# Patient Record
Sex: FEMALE | Race: WHITE | NOT HISPANIC OR LATINO | Employment: OTHER | URBAN - METROPOLITAN AREA
[De-identification: names, ages, dates, MRNs, and addresses within clinical notes are randomized per-mention and may not be internally consistent; named-entity substitution may affect disease eponyms.]

---

## 2017-08-04 ENCOUNTER — TRANSCRIBE ORDERS (OUTPATIENT)
Dept: ADMINISTRATIVE | Facility: HOSPITAL | Age: 82
End: 2017-08-04

## 2017-08-04 DIAGNOSIS — C18.9 MALIGNANT NEOPLASM OF COLON, UNSPECIFIED PART OF COLON (HCC): ICD-10-CM

## 2017-08-04 DIAGNOSIS — C18.2 MALIGNANT NEOPLASM OF ASCENDING COLON (HCC): Primary | ICD-10-CM

## 2017-08-07 ENCOUNTER — OFFICE VISIT (OUTPATIENT)
Dept: LAB | Facility: CLINIC | Age: 82
End: 2017-08-07
Payer: COMMERCIAL

## 2017-08-07 ENCOUNTER — APPOINTMENT (OUTPATIENT)
Dept: LAB | Facility: CLINIC | Age: 82
End: 2017-08-07
Payer: COMMERCIAL

## 2017-08-07 DIAGNOSIS — C18.2 MALIGNANT NEOPLASM OF ASCENDING COLON (HCC): ICD-10-CM

## 2017-08-07 LAB
ALBUMIN SERPL BCP-MCNC: 3.3 G/DL (ref 3.5–5)
ALP SERPL-CCNC: 55 U/L (ref 46–116)
ALT SERPL W P-5'-P-CCNC: 16 U/L (ref 12–78)
ANION GAP SERPL CALCULATED.3IONS-SCNC: 9 MMOL/L (ref 4–13)
AST SERPL W P-5'-P-CCNC: 17 U/L (ref 5–45)
BASOPHILS # BLD AUTO: 0.09 THOUSANDS/ΜL (ref 0–0.1)
BASOPHILS NFR BLD AUTO: 1 % (ref 0–1)
BILIRUB SERPL-MCNC: 1.1 MG/DL (ref 0.2–1)
BUN SERPL-MCNC: 16 MG/DL (ref 5–25)
CALCIUM SERPL-MCNC: 9.1 MG/DL (ref 8.3–10.1)
CHLORIDE SERPL-SCNC: 104 MMOL/L (ref 100–108)
CO2 SERPL-SCNC: 28 MMOL/L (ref 21–32)
CREAT SERPL-MCNC: 0.89 MG/DL (ref 0.6–1.3)
EOSINOPHIL # BLD AUTO: 0.15 THOUSAND/ΜL (ref 0–0.61)
EOSINOPHIL NFR BLD AUTO: 2 % (ref 0–6)
ERYTHROCYTE [DISTWIDTH] IN BLOOD BY AUTOMATED COUNT: 14.1 % (ref 11.6–15.1)
EST. AVERAGE GLUCOSE BLD GHB EST-MCNC: 117 MG/DL
GFR SERPL CREATININE-BSD FRML MDRD: 61 ML/MIN/1.73SQ M
GLUCOSE SERPL-MCNC: 81 MG/DL (ref 65–140)
HBA1C MFR BLD: 5.7 % (ref 4.2–6.3)
HCT VFR BLD AUTO: 46.5 % (ref 34.8–46.1)
HGB BLD-MCNC: 15 G/DL (ref 11.5–15.4)
LYMPHOCYTES # BLD AUTO: 1.72 THOUSANDS/ΜL (ref 0.6–4.47)
LYMPHOCYTES NFR BLD AUTO: 21 % (ref 14–44)
MAGNESIUM SERPL-MCNC: 1.9 MG/DL (ref 1.6–2.6)
MCH RBC QN AUTO: 30.8 PG (ref 26.8–34.3)
MCHC RBC AUTO-ENTMCNC: 32.3 G/DL (ref 31.4–37.4)
MCV RBC AUTO: 96 FL (ref 82–98)
MONOCYTES # BLD AUTO: 0.83 THOUSAND/ΜL (ref 0.17–1.22)
MONOCYTES NFR BLD AUTO: 10 % (ref 4–12)
NEUTROPHILS # BLD AUTO: 5.3 THOUSANDS/ΜL (ref 1.85–7.62)
NEUTS SEG NFR BLD AUTO: 66 % (ref 43–75)
PHOSPHATE SERPL-MCNC: 4 MG/DL (ref 2.3–4.1)
PLATELET # BLD AUTO: 251 THOUSANDS/UL (ref 149–390)
PMV BLD AUTO: 9.1 FL (ref 8.9–12.7)
POTASSIUM SERPL-SCNC: 4.2 MMOL/L (ref 3.5–5.3)
PROT SERPL-MCNC: 7 G/DL (ref 6.4–8.2)
RBC # BLD AUTO: 4.87 MILLION/UL (ref 3.81–5.12)
SODIUM SERPL-SCNC: 141 MMOL/L (ref 136–145)
WBC # BLD AUTO: 8.09 THOUSAND/UL (ref 4.31–10.16)

## 2017-08-07 PROCEDURE — 84100 ASSAY OF PHOSPHORUS: CPT

## 2017-08-07 PROCEDURE — 83036 HEMOGLOBIN GLYCOSYLATED A1C: CPT

## 2017-08-07 PROCEDURE — 86850 RBC ANTIBODY SCREEN: CPT

## 2017-08-07 PROCEDURE — 86900 BLOOD TYPING SEROLOGIC ABO: CPT

## 2017-08-07 PROCEDURE — 85025 COMPLETE CBC W/AUTO DIFF WBC: CPT

## 2017-08-07 PROCEDURE — 93005 ELECTROCARDIOGRAM TRACING: CPT

## 2017-08-07 PROCEDURE — 86920 COMPATIBILITY TEST SPIN: CPT

## 2017-08-07 PROCEDURE — 80053 COMPREHEN METABOLIC PANEL: CPT

## 2017-08-07 PROCEDURE — 36415 COLL VENOUS BLD VENIPUNCTURE: CPT

## 2017-08-07 PROCEDURE — 86923 COMPATIBILITY TEST ELECTRIC: CPT

## 2017-08-07 PROCEDURE — 86901 BLOOD TYPING SEROLOGIC RH(D): CPT

## 2017-08-07 PROCEDURE — 83735 ASSAY OF MAGNESIUM: CPT

## 2017-08-08 LAB
ABO GROUP BLD: NORMAL
ATRIAL RATE: 67 BPM
BLD GP AB SCN SERPL QL: NEGATIVE
P AXIS: 75 DEGREES
PR INTERVAL: 120 MS
QRS AXIS: -5 DEGREES
QRSD INTERVAL: 76 MS
QT INTERVAL: 394 MS
QTC INTERVAL: 416 MS
RH BLD: NEGATIVE
SPECIMEN EXPIRATION DATE: NORMAL
T WAVE AXIS: 105 DEGREES
VENTRICULAR RATE: 67 BPM

## 2017-08-14 ENCOUNTER — HOSPITAL ENCOUNTER (INPATIENT)
Facility: HOSPITAL | Age: 82
LOS: 7 days | Discharge: HOME HEALTH | DRG: 331 | End: 2017-08-22
Attending: EMERGENCY MEDICINE | Admitting: COLON & RECTAL SURGERY
Payer: COMMERCIAL

## 2017-08-14 ENCOUNTER — APPOINTMENT (EMERGENCY)
Dept: CT IMAGING | Facility: HOSPITAL | Age: 82
DRG: 331 | End: 2017-08-14
Payer: COMMERCIAL

## 2017-08-14 DIAGNOSIS — C18.2 MALIGNANT NEOPLASM OF ASCENDING COLON (HCC): ICD-10-CM

## 2017-08-14 DIAGNOSIS — R10.84 GENERALIZED ABDOMINAL PAIN: Primary | ICD-10-CM

## 2017-08-14 DIAGNOSIS — K63.89 COLONIC MASS: ICD-10-CM

## 2017-08-14 DIAGNOSIS — R11.10 VOMITING: ICD-10-CM

## 2017-08-14 LAB
ALBUMIN SERPL BCP-MCNC: 3.6 G/DL (ref 3.5–5)
ALP SERPL-CCNC: 63 U/L (ref 46–116)
ALT SERPL W P-5'-P-CCNC: 21 U/L (ref 12–78)
ANION GAP SERPL CALCULATED.3IONS-SCNC: 8 MMOL/L (ref 4–13)
AST SERPL W P-5'-P-CCNC: 38 U/L (ref 5–45)
BASOPHILS # BLD AUTO: 0.04 THOUSANDS/ΜL (ref 0–0.1)
BASOPHILS NFR BLD AUTO: 0 % (ref 0–1)
BILIRUB SERPL-MCNC: 1.3 MG/DL (ref 0.2–1)
BUN SERPL-MCNC: 14 MG/DL (ref 5–25)
CALCIUM SERPL-MCNC: 9.4 MG/DL (ref 8.3–10.1)
CHLORIDE SERPL-SCNC: 100 MMOL/L (ref 100–108)
CO2 SERPL-SCNC: 28 MMOL/L (ref 21–32)
CREAT SERPL-MCNC: 0.83 MG/DL (ref 0.6–1.3)
EOSINOPHIL # BLD AUTO: 0.02 THOUSAND/ΜL (ref 0–0.61)
EOSINOPHIL NFR BLD AUTO: 0 % (ref 0–6)
ERYTHROCYTE [DISTWIDTH] IN BLOOD BY AUTOMATED COUNT: 14 % (ref 11.6–15.1)
GFR SERPL CREATININE-BSD FRML MDRD: 66 ML/MIN/1.73SQ M
GLUCOSE SERPL-MCNC: 136 MG/DL (ref 65–140)
HCT VFR BLD AUTO: 51.1 % (ref 34.8–46.1)
HGB BLD-MCNC: 16.7 G/DL (ref 11.5–15.4)
LIPASE SERPL-CCNC: 80 U/L (ref 73–393)
LYMPHOCYTES # BLD AUTO: 1.03 THOUSANDS/ΜL (ref 0.6–4.47)
LYMPHOCYTES NFR BLD AUTO: 8 % (ref 14–44)
MCH RBC QN AUTO: 30.6 PG (ref 26.8–34.3)
MCHC RBC AUTO-ENTMCNC: 32.7 G/DL (ref 31.4–37.4)
MCV RBC AUTO: 94 FL (ref 82–98)
MONOCYTES # BLD AUTO: 0.52 THOUSAND/ΜL (ref 0.17–1.22)
MONOCYTES NFR BLD AUTO: 4 % (ref 4–12)
NEUTROPHILS # BLD AUTO: 11 THOUSANDS/ΜL (ref 1.85–7.62)
NEUTS SEG NFR BLD AUTO: 88 % (ref 43–75)
PLATELET # BLD AUTO: 322 THOUSANDS/UL (ref 149–390)
PMV BLD AUTO: 9.1 FL (ref 8.9–12.7)
POTASSIUM SERPL-SCNC: 5.1 MMOL/L (ref 3.5–5.3)
PROT SERPL-MCNC: 8.2 G/DL (ref 6.4–8.2)
RBC # BLD AUTO: 5.45 MILLION/UL (ref 3.81–5.12)
SODIUM SERPL-SCNC: 136 MMOL/L (ref 136–145)
WBC # BLD AUTO: 12.61 THOUSAND/UL (ref 4.31–10.16)

## 2017-08-14 PROCEDURE — 85025 COMPLETE CBC W/AUTO DIFF WBC: CPT | Performed by: EMERGENCY MEDICINE

## 2017-08-14 PROCEDURE — 36415 COLL VENOUS BLD VENIPUNCTURE: CPT | Performed by: EMERGENCY MEDICINE

## 2017-08-14 PROCEDURE — 83690 ASSAY OF LIPASE: CPT | Performed by: EMERGENCY MEDICINE

## 2017-08-14 PROCEDURE — 71260 CT THORAX DX C+: CPT

## 2017-08-14 PROCEDURE — 96375 TX/PRO/DX INJ NEW DRUG ADDON: CPT

## 2017-08-14 PROCEDURE — 74177 CT ABD & PELVIS W/CONTRAST: CPT

## 2017-08-14 PROCEDURE — 96374 THER/PROPH/DIAG INJ IV PUSH: CPT

## 2017-08-14 PROCEDURE — 80053 COMPREHEN METABOLIC PANEL: CPT | Performed by: EMERGENCY MEDICINE

## 2017-08-14 PROCEDURE — 96361 HYDRATE IV INFUSION ADD-ON: CPT

## 2017-08-14 RX ORDER — MORPHINE SULFATE 4 MG/ML
4 INJECTION, SOLUTION INTRAMUSCULAR; INTRAVENOUS ONCE
Status: COMPLETED | OUTPATIENT
Start: 2017-08-14 | End: 2017-08-14

## 2017-08-14 RX ORDER — ONDANSETRON 2 MG/ML
4 INJECTION INTRAMUSCULAR; INTRAVENOUS ONCE
Status: COMPLETED | OUTPATIENT
Start: 2017-08-14 | End: 2017-08-14

## 2017-08-14 RX ADMIN — SODIUM CHLORIDE 500 ML: 0.9 INJECTION, SOLUTION INTRAVENOUS at 22:38

## 2017-08-14 RX ADMIN — ONDANSETRON 4 MG: 2 INJECTION INTRAMUSCULAR; INTRAVENOUS at 22:38

## 2017-08-14 RX ADMIN — MORPHINE SULFATE 4 MG: 4 INJECTION, SOLUTION INTRAMUSCULAR; INTRAVENOUS at 22:51

## 2017-08-15 PROBLEM — K63.89 COLONIC MASS: Status: ACTIVE | Noted: 2017-08-15

## 2017-08-15 PROCEDURE — 99285 EMERGENCY DEPT VISIT HI MDM: CPT

## 2017-08-15 RX ORDER — POLYETHYLENE GLYCOL 3350 17 G/17G
17 POWDER, FOR SOLUTION ORAL
Status: DISCONTINUED | OUTPATIENT
Start: 2017-08-15 | End: 2017-08-15

## 2017-08-15 RX ORDER — ONDANSETRON 2 MG/ML
4 INJECTION INTRAMUSCULAR; INTRAVENOUS EVERY 6 HOURS PRN
Status: DISCONTINUED | OUTPATIENT
Start: 2017-08-15 | End: 2017-08-22 | Stop reason: HOSPADM

## 2017-08-15 RX ORDER — ONDANSETRON 2 MG/ML
4 INJECTION INTRAMUSCULAR; INTRAVENOUS ONCE
Status: COMPLETED | OUTPATIENT
Start: 2017-08-15 | End: 2017-08-15

## 2017-08-15 RX ORDER — SODIUM CHLORIDE 9 MG/ML
100 INJECTION, SOLUTION INTRAVENOUS CONTINUOUS
Status: DISCONTINUED | OUTPATIENT
Start: 2017-08-15 | End: 2017-08-18

## 2017-08-15 RX ORDER — MORPHINE SULFATE 4 MG/ML
4 INJECTION, SOLUTION INTRAMUSCULAR; INTRAVENOUS ONCE
Status: DISCONTINUED | OUTPATIENT
Start: 2017-08-15 | End: 2017-08-18

## 2017-08-15 RX ORDER — POLYETHYLENE GLYCOL 3350 17 G/17G
17 POWDER, FOR SOLUTION ORAL
Status: DISCONTINUED | OUTPATIENT
Start: 2017-08-15 | End: 2017-08-18

## 2017-08-15 RX ADMIN — POLYETHYLENE GLYCOL 3350 17 G: 17 POWDER, FOR SOLUTION ORAL at 23:00

## 2017-08-15 RX ADMIN — POLYETHYLENE GLYCOL 3350 17 G: 17 POWDER, FOR SOLUTION ORAL at 20:16

## 2017-08-15 RX ADMIN — POLYETHYLENE GLYCOL 3350 17 G: 17 POWDER, FOR SOLUTION ORAL at 15:55

## 2017-08-15 RX ADMIN — SODIUM CHLORIDE 100 ML/HR: 0.9 INJECTION, SOLUTION INTRAVENOUS at 03:43

## 2017-08-15 RX ADMIN — ONDANSETRON 4 MG: 2 INJECTION INTRAMUSCULAR; INTRAVENOUS at 01:54

## 2017-08-15 RX ADMIN — SODIUM CHLORIDE 100 ML/HR: 0.9 INJECTION, SOLUTION INTRAVENOUS at 01:54

## 2017-08-15 RX ADMIN — IOHEXOL 100 ML: 350 INJECTION, SOLUTION INTRAVENOUS at 00:12

## 2017-08-15 RX ADMIN — SODIUM CHLORIDE 100 ML/HR: 0.9 INJECTION, SOLUTION INTRAVENOUS at 13:46

## 2017-08-16 LAB
ANION GAP SERPL CALCULATED.3IONS-SCNC: 5 MMOL/L (ref 4–13)
BASOPHILS # BLD AUTO: 0.05 THOUSANDS/ΜL (ref 0–0.1)
BASOPHILS NFR BLD AUTO: 1 % (ref 0–1)
BUN SERPL-MCNC: 8 MG/DL (ref 5–25)
CALCIUM SERPL-MCNC: 8 MG/DL (ref 8.3–10.1)
CHLORIDE SERPL-SCNC: 106 MMOL/L (ref 100–108)
CO2 SERPL-SCNC: 28 MMOL/L (ref 21–32)
CREAT SERPL-MCNC: 0.64 MG/DL (ref 0.6–1.3)
EOSINOPHIL # BLD AUTO: 0.11 THOUSAND/ΜL (ref 0–0.61)
EOSINOPHIL NFR BLD AUTO: 1 % (ref 0–6)
ERYTHROCYTE [DISTWIDTH] IN BLOOD BY AUTOMATED COUNT: 13.9 % (ref 11.6–15.1)
GFR SERPL CREATININE-BSD FRML MDRD: 83 ML/MIN/1.73SQ M
GLUCOSE SERPL-MCNC: 96 MG/DL (ref 65–140)
HCT VFR BLD AUTO: 43 % (ref 34.8–46.1)
HGB BLD-MCNC: 13.8 G/DL (ref 11.5–15.4)
LYMPHOCYTES # BLD AUTO: 1.31 THOUSANDS/ΜL (ref 0.6–4.47)
LYMPHOCYTES NFR BLD AUTO: 13 % (ref 14–44)
MCH RBC QN AUTO: 31.1 PG (ref 26.8–34.3)
MCHC RBC AUTO-ENTMCNC: 32.1 G/DL (ref 31.4–37.4)
MCV RBC AUTO: 97 FL (ref 82–98)
MONOCYTES # BLD AUTO: 0.79 THOUSAND/ΜL (ref 0.17–1.22)
MONOCYTES NFR BLD AUTO: 8 % (ref 4–12)
NEUTROPHILS # BLD AUTO: 7.63 THOUSANDS/ΜL (ref 1.85–7.62)
NEUTS SEG NFR BLD AUTO: 77 % (ref 43–75)
PLATELET # BLD AUTO: 258 THOUSANDS/UL (ref 149–390)
PMV BLD AUTO: 9.1 FL (ref 8.9–12.7)
POTASSIUM SERPL-SCNC: 4.2 MMOL/L (ref 3.5–5.3)
RBC # BLD AUTO: 4.44 MILLION/UL (ref 3.81–5.12)
SODIUM SERPL-SCNC: 139 MMOL/L (ref 136–145)
WBC # BLD AUTO: 9.89 THOUSAND/UL (ref 4.31–10.16)

## 2017-08-16 PROCEDURE — 80048 BASIC METABOLIC PNL TOTAL CA: CPT | Performed by: PHYSICIAN ASSISTANT

## 2017-08-16 PROCEDURE — 85025 COMPLETE CBC W/AUTO DIFF WBC: CPT | Performed by: PHYSICIAN ASSISTANT

## 2017-08-16 RX ADMIN — POLYETHYLENE GLYCOL 3350 17 G: 17 POWDER, FOR SOLUTION ORAL at 07:19

## 2017-08-16 RX ADMIN — SODIUM CHLORIDE 100 ML/HR: 0.9 INJECTION, SOLUTION INTRAVENOUS at 17:22

## 2017-08-16 RX ADMIN — POLYETHYLENE GLYCOL 3350 17 G: 17 POWDER, FOR SOLUTION ORAL at 19:35

## 2017-08-16 RX ADMIN — POLYETHYLENE GLYCOL 3350 17 G: 17 POWDER, FOR SOLUTION ORAL at 16:41

## 2017-08-16 RX ADMIN — POLYETHYLENE GLYCOL 3350 17 G: 17 POWDER, FOR SOLUTION ORAL at 13:00

## 2017-08-16 RX ADMIN — SODIUM CHLORIDE 100 ML/HR: 0.9 INJECTION, SOLUTION INTRAVENOUS at 01:52

## 2017-08-16 RX ADMIN — POLYETHYLENE GLYCOL 3350 17 G: 17 POWDER, FOR SOLUTION ORAL at 22:17

## 2017-08-16 RX ADMIN — POLYETHYLENE GLYCOL 3350 17 G: 17 POWDER, FOR SOLUTION ORAL at 10:00

## 2017-08-17 LAB
ABO GROUP BLD: NORMAL
BLD GP AB SCN SERPL QL: NEGATIVE
RH BLD: NEGATIVE
SPECIMEN EXPIRATION DATE: NORMAL

## 2017-08-17 PROCEDURE — 86850 RBC ANTIBODY SCREEN: CPT | Performed by: PHYSICIAN ASSISTANT

## 2017-08-17 PROCEDURE — 86901 BLOOD TYPING SEROLOGIC RH(D): CPT | Performed by: PHYSICIAN ASSISTANT

## 2017-08-17 PROCEDURE — 86900 BLOOD TYPING SEROLOGIC ABO: CPT | Performed by: PHYSICIAN ASSISTANT

## 2017-08-17 RX ORDER — METRONIDAZOLE 500 MG/1
500 TABLET ORAL ONCE
Status: COMPLETED | OUTPATIENT
Start: 2017-08-17 | End: 2017-08-17

## 2017-08-17 RX ORDER — NEOMYCIN SULFATE 500 MG/1
1000 TABLET ORAL ONCE
Status: COMPLETED | OUTPATIENT
Start: 2017-08-17 | End: 2017-08-17

## 2017-08-17 RX ADMIN — POLYETHYLENE GLYCOL 3350 17 G: 17 POWDER, FOR SOLUTION ORAL at 16:04

## 2017-08-17 RX ADMIN — POLYETHYLENE GLYCOL 3350 17 G: 17 POWDER, FOR SOLUTION ORAL at 13:35

## 2017-08-17 RX ADMIN — SODIUM CHLORIDE 100 ML/HR: 0.9 INJECTION, SOLUTION INTRAVENOUS at 02:47

## 2017-08-17 RX ADMIN — POLYETHYLENE GLYCOL 3350 17 G: 17 POWDER, FOR SOLUTION ORAL at 22:18

## 2017-08-17 RX ADMIN — POLYETHYLENE GLYCOL 3350 17 G: 17 POWDER, FOR SOLUTION ORAL at 09:17

## 2017-08-17 RX ADMIN — POLYETHYLENE GLYCOL 3350 17 G: 17 POWDER, FOR SOLUTION ORAL at 20:23

## 2017-08-17 RX ADMIN — POLYETHYLENE GLYCOL 3350 17 G: 17 POWDER, FOR SOLUTION ORAL at 06:56

## 2017-08-17 RX ADMIN — METRONIDAZOLE 500 MG: 500 TABLET ORAL at 20:24

## 2017-08-17 RX ADMIN — NEOMYCIN SULFATE 1000 MG: 500 TABLET ORAL at 20:24

## 2017-08-18 ENCOUNTER — ANESTHESIA (INPATIENT)
Dept: PERIOP | Facility: HOSPITAL | Age: 82
DRG: 331 | End: 2017-08-18
Payer: COMMERCIAL

## 2017-08-18 ENCOUNTER — ANESTHESIA EVENT (INPATIENT)
Dept: PERIOP | Facility: HOSPITAL | Age: 82
DRG: 331 | End: 2017-08-18
Payer: COMMERCIAL

## 2017-08-18 LAB
ABO GROUP BLD BPU: NORMAL
ABO GROUP BLD BPU: NORMAL
BPU ID: NORMAL
BPU ID: NORMAL
CROSSMATCH: NORMAL
CROSSMATCH: NORMAL
GLUCOSE SERPL-MCNC: 115 MG/DL (ref 65–140)
UNIT DISPENSE STATUS: NORMAL
UNIT DISPENSE STATUS: NORMAL
UNIT PRODUCT CODE: NORMAL
UNIT PRODUCT CODE: NORMAL
UNIT RH: NORMAL
UNIT RH: NORMAL

## 2017-08-18 PROCEDURE — 88341 IMHCHEM/IMCYTCHM EA ADD ANTB: CPT | Performed by: COLON & RECTAL SURGERY

## 2017-08-18 PROCEDURE — 94760 N-INVAS EAR/PLS OXIMETRY 1: CPT

## 2017-08-18 PROCEDURE — 88309 TISSUE EXAM BY PATHOLOGIST: CPT | Performed by: COLON & RECTAL SURGERY

## 2017-08-18 PROCEDURE — 88342 IMHCHEM/IMCYTCHM 1ST ANTB: CPT | Performed by: COLON & RECTAL SURGERY

## 2017-08-18 PROCEDURE — 0DTF4ZZ RESECTION OF RIGHT LARGE INTESTINE, PERCUTANEOUS ENDOSCOPIC APPROACH: ICD-10-PCS | Performed by: COLON & RECTAL SURGERY

## 2017-08-18 PROCEDURE — 94660 CPAP INITIATION&MGMT: CPT

## 2017-08-18 PROCEDURE — 82948 REAGENT STRIP/BLOOD GLUCOSE: CPT

## 2017-08-18 RX ORDER — SODIUM CHLORIDE 9 MG/ML
84 INJECTION, SOLUTION INTRAVENOUS CONTINUOUS
Status: DISCONTINUED | OUTPATIENT
Start: 2017-08-18 | End: 2017-08-19

## 2017-08-18 RX ORDER — FENTANYL CITRATE 50 UG/ML
INJECTION, SOLUTION INTRAMUSCULAR; INTRAVENOUS AS NEEDED
Status: DISCONTINUED | OUTPATIENT
Start: 2017-08-18 | End: 2017-08-18 | Stop reason: SURG

## 2017-08-18 RX ORDER — SODIUM CHLORIDE 9 MG/ML
INJECTION, SOLUTION INTRAVENOUS CONTINUOUS PRN
Status: DISCONTINUED | OUTPATIENT
Start: 2017-08-18 | End: 2017-08-18 | Stop reason: SURG

## 2017-08-18 RX ORDER — GLYCOPYRROLATE 0.2 MG/ML
INJECTION INTRAMUSCULAR; INTRAVENOUS AS NEEDED
Status: DISCONTINUED | OUTPATIENT
Start: 2017-08-18 | End: 2017-08-18 | Stop reason: SURG

## 2017-08-18 RX ORDER — ONDANSETRON 2 MG/ML
4 INJECTION INTRAMUSCULAR; INTRAVENOUS EVERY 6 HOURS PRN
Status: DISCONTINUED | OUTPATIENT
Start: 2017-08-18 | End: 2017-08-22 | Stop reason: HOSPADM

## 2017-08-18 RX ORDER — HEPARIN SODIUM 5000 [USP'U]/ML
5000 INJECTION, SOLUTION INTRAVENOUS; SUBCUTANEOUS EVERY 8 HOURS SCHEDULED
Status: COMPLETED | OUTPATIENT
Start: 2017-08-18 | End: 2017-08-18

## 2017-08-18 RX ORDER — SODIUM CHLORIDE, SODIUM LACTATE, POTASSIUM CHLORIDE, CALCIUM CHLORIDE 600; 310; 30; 20 MG/100ML; MG/100ML; MG/100ML; MG/100ML
INJECTION, SOLUTION INTRAVENOUS CONTINUOUS PRN
Status: DISCONTINUED | OUTPATIENT
Start: 2017-08-18 | End: 2017-08-18 | Stop reason: SURG

## 2017-08-18 RX ORDER — ONDANSETRON 2 MG/ML
4 INJECTION INTRAMUSCULAR; INTRAVENOUS ONCE AS NEEDED
Status: DISCONTINUED | OUTPATIENT
Start: 2017-08-18 | End: 2017-08-18 | Stop reason: HOSPADM

## 2017-08-18 RX ORDER — CALCIUM CARBONATE 200(500)MG
1000 TABLET,CHEWABLE ORAL DAILY PRN
Status: DISCONTINUED | OUTPATIENT
Start: 2017-08-18 | End: 2017-08-22 | Stop reason: HOSPADM

## 2017-08-18 RX ORDER — ROCURONIUM BROMIDE 10 MG/ML
INJECTION, SOLUTION INTRAVENOUS AS NEEDED
Status: DISCONTINUED | OUTPATIENT
Start: 2017-08-18 | End: 2017-08-18 | Stop reason: SURG

## 2017-08-18 RX ORDER — HEPARIN SODIUM 5000 [USP'U]/ML
INJECTION, SOLUTION INTRAVENOUS; SUBCUTANEOUS AS NEEDED
Status: DISCONTINUED | OUTPATIENT
Start: 2017-08-18 | End: 2017-08-18 | Stop reason: SURG

## 2017-08-18 RX ORDER — LIDOCAINE HYDROCHLORIDE 10 MG/ML
INJECTION, SOLUTION INFILTRATION; PERINEURAL AS NEEDED
Status: DISCONTINUED | OUTPATIENT
Start: 2017-08-18 | End: 2017-08-18 | Stop reason: SURG

## 2017-08-18 RX ORDER — EPHEDRINE SULFATE 50 MG/ML
INJECTION, SOLUTION INTRAVENOUS AS NEEDED
Status: DISCONTINUED | OUTPATIENT
Start: 2017-08-18 | End: 2017-08-18 | Stop reason: SURG

## 2017-08-18 RX ORDER — FENTANYL CITRATE/PF 50 MCG/ML
25 SYRINGE (ML) INJECTION
Status: DISCONTINUED | OUTPATIENT
Start: 2017-08-18 | End: 2017-08-18 | Stop reason: HOSPADM

## 2017-08-18 RX ORDER — HYDROCODONE BITARTRATE AND ACETAMINOPHEN 5; 325 MG/1; MG/1
1 TABLET ORAL EVERY 6 HOURS PRN
Status: DISCONTINUED | OUTPATIENT
Start: 2017-08-18 | End: 2017-08-22 | Stop reason: HOSPADM

## 2017-08-18 RX ORDER — PROPOFOL 10 MG/ML
INJECTION, EMULSION INTRAVENOUS AS NEEDED
Status: DISCONTINUED | OUTPATIENT
Start: 2017-08-18 | End: 2017-08-18 | Stop reason: SURG

## 2017-08-18 RX ADMIN — PROPOFOL 120 MG: 10 INJECTION, EMULSION INTRAVENOUS at 11:13

## 2017-08-18 RX ADMIN — EPHEDRINE SULFATE 10 MG: 50 INJECTION, SOLUTION INTRAMUSCULAR; INTRAVENOUS; SUBCUTANEOUS at 11:15

## 2017-08-18 RX ADMIN — SODIUM CHLORIDE: 0.9 INJECTION, SOLUTION INTRAVENOUS at 11:15

## 2017-08-18 RX ADMIN — HYDROMORPHONE HYDROCHLORIDE 0.5 MG: 1 INJECTION, SOLUTION INTRAMUSCULAR; INTRAVENOUS; SUBCUTANEOUS at 14:38

## 2017-08-18 RX ADMIN — LIDOCAINE HYDROCHLORIDE 50 MG: 10 INJECTION, SOLUTION INFILTRATION; PERINEURAL at 11:13

## 2017-08-18 RX ADMIN — GLYCOPYRROLATE 0.4 MG: 0.2 INJECTION, SOLUTION INTRAMUSCULAR; INTRAVENOUS at 12:27

## 2017-08-18 RX ADMIN — FENTANYL CITRATE 50 MCG: 50 INJECTION INTRAMUSCULAR; INTRAVENOUS at 11:13

## 2017-08-18 RX ADMIN — HEPARIN SODIUM 5000 UNITS: 5000 INJECTION, SOLUTION INTRAVENOUS; SUBCUTANEOUS at 11:40

## 2017-08-18 RX ADMIN — ROCURONIUM BROMIDE 50 MG: 10 INJECTION, SOLUTION INTRAVENOUS at 11:13

## 2017-08-18 RX ADMIN — HEPARIN SODIUM 5000 UNITS: 5000 INJECTION, SOLUTION INTRAVENOUS; SUBCUTANEOUS at 21:50

## 2017-08-18 RX ADMIN — CEFAZOLIN SODIUM 1000 MG: 1 INJECTION, POWDER, FOR SOLUTION INTRAMUSCULAR; INTRAVENOUS at 11:25

## 2017-08-18 RX ADMIN — NEOSTIGMINE METHYLSULFATE 4 MG: 1 INJECTION, SOLUTION INTRAMUSCULAR; INTRAVENOUS; SUBCUTANEOUS at 12:27

## 2017-08-18 RX ADMIN — FENTANYL CITRATE 25 MCG: 50 INJECTION INTRAMUSCULAR; INTRAVENOUS at 13:40

## 2017-08-18 RX ADMIN — SODIUM CHLORIDE, SODIUM LACTATE, POTASSIUM CHLORIDE, AND CALCIUM CHLORIDE: .6; .31; .03; .02 INJECTION, SOLUTION INTRAVENOUS at 10:50

## 2017-08-18 RX ADMIN — SODIUM CHLORIDE 84 ML/HR: 0.9 INJECTION, SOLUTION INTRAVENOUS at 21:50

## 2017-08-18 RX ADMIN — FENTANYL CITRATE 25 MCG: 50 INJECTION INTRAMUSCULAR; INTRAVENOUS at 14:05

## 2017-08-18 RX ADMIN — SODIUM CHLORIDE 84 ML/HR: 0.9 INJECTION, SOLUTION INTRAVENOUS at 17:05

## 2017-08-18 RX ADMIN — FENTANYL CITRATE 50 MCG: 50 INJECTION INTRAMUSCULAR; INTRAVENOUS at 11:54

## 2017-08-18 RX ADMIN — METRONIDAZOLE 500 MG: 500 INJECTION, SOLUTION INTRAVENOUS at 11:25

## 2017-08-18 RX ADMIN — ONDANSETRON 4 MG: 2 INJECTION INTRAMUSCULAR; INTRAVENOUS at 16:24

## 2017-08-18 RX ADMIN — SODIUM CHLORIDE 100 ML/HR: 0.9 INJECTION, SOLUTION INTRAVENOUS at 14:39

## 2017-08-18 RX ADMIN — EPHEDRINE SULFATE 10 MG: 50 INJECTION, SOLUTION INTRAMUSCULAR; INTRAVENOUS; SUBCUTANEOUS at 11:31

## 2017-08-19 LAB
ANION GAP SERPL CALCULATED.3IONS-SCNC: 11 MMOL/L (ref 4–13)
BASOPHILS # BLD AUTO: 0.03 THOUSANDS/ΜL (ref 0–0.1)
BASOPHILS NFR BLD AUTO: 0 % (ref 0–1)
BUN SERPL-MCNC: 5 MG/DL (ref 5–25)
CALCIUM SERPL-MCNC: 7.9 MG/DL (ref 8.3–10.1)
CHLORIDE SERPL-SCNC: 104 MMOL/L (ref 100–108)
CO2 SERPL-SCNC: 24 MMOL/L (ref 21–32)
CREAT SERPL-MCNC: 0.73 MG/DL (ref 0.6–1.3)
EOSINOPHIL # BLD AUTO: 0.02 THOUSAND/ΜL (ref 0–0.61)
EOSINOPHIL NFR BLD AUTO: 0 % (ref 0–6)
ERYTHROCYTE [DISTWIDTH] IN BLOOD BY AUTOMATED COUNT: 13.9 % (ref 11.6–15.1)
GFR SERPL CREATININE-BSD FRML MDRD: 77 ML/MIN/1.73SQ M
GLUCOSE SERPL-MCNC: 104 MG/DL (ref 65–140)
HCT VFR BLD AUTO: 41.9 % (ref 34.8–46.1)
HGB BLD-MCNC: 13.6 G/DL (ref 11.5–15.4)
LYMPHOCYTES # BLD AUTO: 0.99 THOUSANDS/ΜL (ref 0.6–4.47)
LYMPHOCYTES NFR BLD AUTO: 9 % (ref 14–44)
MCH RBC QN AUTO: 30.8 PG (ref 26.8–34.3)
MCHC RBC AUTO-ENTMCNC: 32.5 G/DL (ref 31.4–37.4)
MCV RBC AUTO: 95 FL (ref 82–98)
MONOCYTES # BLD AUTO: 0.91 THOUSAND/ΜL (ref 0.17–1.22)
MONOCYTES NFR BLD AUTO: 8 % (ref 4–12)
NEUTROPHILS # BLD AUTO: 9.42 THOUSANDS/ΜL (ref 1.85–7.62)
NEUTS SEG NFR BLD AUTO: 83 % (ref 43–75)
PLATELET # BLD AUTO: 266 THOUSANDS/UL (ref 149–390)
PMV BLD AUTO: 9.8 FL (ref 8.9–12.7)
POTASSIUM SERPL-SCNC: 3.3 MMOL/L (ref 3.5–5.3)
RBC # BLD AUTO: 4.41 MILLION/UL (ref 3.81–5.12)
SODIUM SERPL-SCNC: 139 MMOL/L (ref 136–145)
WBC # BLD AUTO: 11.37 THOUSAND/UL (ref 4.31–10.16)

## 2017-08-19 PROCEDURE — G8978 MOBILITY CURRENT STATUS: HCPCS

## 2017-08-19 PROCEDURE — 97163 PT EVAL HIGH COMPLEX 45 MIN: CPT

## 2017-08-19 PROCEDURE — 94660 CPAP INITIATION&MGMT: CPT

## 2017-08-19 PROCEDURE — G8979 MOBILITY GOAL STATUS: HCPCS

## 2017-08-19 PROCEDURE — 80048 BASIC METABOLIC PNL TOTAL CA: CPT | Performed by: SURGERY

## 2017-08-19 PROCEDURE — 85025 COMPLETE CBC W/AUTO DIFF WBC: CPT | Performed by: SURGERY

## 2017-08-19 RX ORDER — MAGNESIUM SULFATE HEPTAHYDRATE 40 MG/ML
2 INJECTION, SOLUTION INTRAVENOUS ONCE
Status: COMPLETED | OUTPATIENT
Start: 2017-08-19 | End: 2017-08-19

## 2017-08-19 RX ORDER — DEXTROSE, SODIUM CHLORIDE, AND POTASSIUM CHLORIDE 5; .45; .15 G/100ML; G/100ML; G/100ML
84 INJECTION INTRAVENOUS CONTINUOUS
Status: DISCONTINUED | OUTPATIENT
Start: 2017-08-19 | End: 2017-08-20

## 2017-08-19 RX ORDER — POTASSIUM CHLORIDE 20 MEQ/1
40 TABLET, EXTENDED RELEASE ORAL ONCE
Status: COMPLETED | OUTPATIENT
Start: 2017-08-19 | End: 2017-08-19

## 2017-08-19 RX ADMIN — ONDANSETRON 4 MG: 2 INJECTION INTRAMUSCULAR; INTRAVENOUS at 07:03

## 2017-08-19 RX ADMIN — ENOXAPARIN SODIUM 40 MG: 40 INJECTION SUBCUTANEOUS at 08:28

## 2017-08-19 RX ADMIN — HYDROCODONE BITARTRATE AND ACETAMINOPHEN 1 TABLET: 5; 325 TABLET ORAL at 07:03

## 2017-08-19 RX ADMIN — SODIUM CHLORIDE 84 ML/HR: 0.9 INJECTION, SOLUTION INTRAVENOUS at 08:34

## 2017-08-19 RX ADMIN — DEXTROSE, SODIUM CHLORIDE, AND POTASSIUM CHLORIDE 84 ML/HR: 5; .45; .15 INJECTION INTRAVENOUS at 16:18

## 2017-08-19 RX ADMIN — MAGNESIUM SULFATE HEPTAHYDRATE 2 G: 40 INJECTION, SOLUTION INTRAVENOUS at 16:20

## 2017-08-19 RX ADMIN — POTASSIUM CHLORIDE 40 MEQ: 1500 TABLET, EXTENDED RELEASE ORAL at 16:02

## 2017-08-19 RX ADMIN — SODIUM CHLORIDE 500 ML: 0.9 INJECTION, SOLUTION INTRAVENOUS at 09:12

## 2017-08-20 RX ADMIN — ENOXAPARIN SODIUM 40 MG: 40 INJECTION SUBCUTANEOUS at 08:21

## 2017-08-20 RX ADMIN — DEXTROSE, SODIUM CHLORIDE, AND POTASSIUM CHLORIDE 84 ML/HR: 5; .45; .15 INJECTION INTRAVENOUS at 04:21

## 2017-08-21 ENCOUNTER — APPOINTMENT (INPATIENT)
Dept: OCCUPATIONAL THERAPY | Facility: HOSPITAL | Age: 82
DRG: 331 | End: 2017-08-21
Payer: COMMERCIAL

## 2017-08-21 LAB
ABO GROUP BLD BPU: NORMAL
ABO GROUP BLD BPU: NORMAL
BPU ID: NORMAL
BPU ID: NORMAL
UNIT DISPENSE STATUS: NORMAL
UNIT DISPENSE STATUS: NORMAL
UNIT PRODUCT CODE: NORMAL
UNIT PRODUCT CODE: NORMAL
UNIT RH: NORMAL
UNIT RH: NORMAL

## 2017-08-21 PROCEDURE — 97165 OT EVAL LOW COMPLEX 30 MIN: CPT

## 2017-08-21 PROCEDURE — G8987 SELF CARE CURRENT STATUS: HCPCS

## 2017-08-21 PROCEDURE — G8989 SELF CARE D/C STATUS: HCPCS

## 2017-08-21 PROCEDURE — G8988 SELF CARE GOAL STATUS: HCPCS

## 2017-08-21 RX ADMIN — ENOXAPARIN SODIUM 40 MG: 40 INJECTION SUBCUTANEOUS at 08:19

## 2017-08-22 ENCOUNTER — APPOINTMENT (INPATIENT)
Dept: PHYSICAL THERAPY | Facility: HOSPITAL | Age: 82
DRG: 331 | End: 2017-08-22
Payer: COMMERCIAL

## 2017-08-22 VITALS
WEIGHT: 120 LBS | HEART RATE: 81 BPM | OXYGEN SATURATION: 90 % | HEIGHT: 62 IN | SYSTOLIC BLOOD PRESSURE: 110 MMHG | BODY MASS INDEX: 22.08 KG/M2 | DIASTOLIC BLOOD PRESSURE: 80 MMHG | RESPIRATION RATE: 18 BRPM | TEMPERATURE: 99.1 F

## 2017-08-22 PROCEDURE — 97116 GAIT TRAINING THERAPY: CPT

## 2017-08-22 PROCEDURE — 97110 THERAPEUTIC EXERCISES: CPT

## 2017-08-22 RX ORDER — HYDROCODONE BITARTRATE AND ACETAMINOPHEN 5; 325 MG/1; MG/1
1 TABLET ORAL EVERY 6 HOURS PRN
Qty: 15 TABLET | Refills: 0 | Status: SHIPPED | OUTPATIENT
Start: 2017-08-22 | End: 2017-09-01

## 2017-08-22 RX ORDER — ACETAMINOPHEN 160 MG/5ML
650 SUSPENSION, ORAL (FINAL DOSE FORM) ORAL EVERY 6 HOURS PRN
Status: DISCONTINUED | OUTPATIENT
Start: 2017-08-22 | End: 2017-08-22 | Stop reason: HOSPADM

## 2017-08-22 RX ADMIN — ACETAMINOPHEN 650 MG: 160 SUSPENSION ORAL at 11:39

## 2017-08-22 RX ADMIN — ENOXAPARIN SODIUM 40 MG: 40 INJECTION SUBCUTANEOUS at 08:46

## 2017-11-30 ENCOUNTER — GENERIC CONVERSION - ENCOUNTER (OUTPATIENT)
Dept: OTHER | Facility: OTHER | Age: 82
End: 2017-11-30

## 2017-11-30 ENCOUNTER — GENERIC CONVERSION - ENCOUNTER (OUTPATIENT)
Dept: HEMATOLOGY ONCOLOGY | Facility: CLINIC | Age: 82
End: 2017-11-30

## 2017-12-29 ENCOUNTER — TRANSCRIBE ORDERS (OUTPATIENT)
Dept: ADMINISTRATIVE | Facility: HOSPITAL | Age: 82
End: 2017-12-29

## 2017-12-29 ENCOUNTER — ALLSCRIPTS OFFICE VISIT (OUTPATIENT)
Dept: OTHER | Facility: OTHER | Age: 82
End: 2017-12-29

## 2017-12-29 DIAGNOSIS — C18.2 MALIGNANT NEOPLASM OF ASCENDING COLON (HCC): Primary | ICD-10-CM

## 2017-12-29 DIAGNOSIS — C18.2 MALIGNANT NEOPLASM OF ASCENDING COLON (HCC): ICD-10-CM

## 2017-12-30 NOTE — CONSULTS
Assessment   1  Malignant neoplasm of ascending colon (153 6) (C18 2)    Plan   Malignant neoplasm of ascending colon    · (1) CBC/PLT/DIFF; Status:Active; Requested for:[id number];    Perform:[facility]; DSJ:[id number]; Ordered; For:Malignant neoplasm of ascending colon; Ordered By:Khang Herrera [employer]);   · (1) CEA; Status:Active; Requested for:78Plt1692;    Perform:[facility]; PIZ:[id number]; Ordered; For:Malignant neoplasm of ascending colon; Ordered By:AllozyneKhang Tocagenpetros Tr[employer]cape);   · (1) COMPREHENSIVE METABOLIC PANEL; Status:Active; Requested for:68Nok2627;    Perform:[facility]; ATI:[id number]; Ordered; For:Malignant neoplasm of ascending colon; Ordered By:Khang Herrera 4tiitoodavon [employer]);   · * CT ABDOMEN PELVIS W CONTRAST; Status:Need Information - Financial Authorization; Requested for:[id number] 04:00PM;    Perform:[facility]; Order Comments:**No Oral Contrast**[facility]pm  Arrive 15 minutes early  NO FOOD 3 hours prior ; Due:62Sau2080; Last Updated By:Yolanda Rodrigez; 12/29/2017 4:18:45 PM;Ordered; For:Malignant neoplasm of ascending colon; Ordered By:Khang Herrera 4tiitoodavon [employer]); · Follow-up visit in 2 months Evaluation and Treatment  Follow-up  Status: Complete     Done: 01LAJ0510   Ordered; For: Malignant neoplasm of ascending colon; Ordered By: Alta Green) Performed:  Due: 57QQR0111; Last Updated By: Justin Ramesh; 12/29/2017 4:20:59 PM    Discussion/Summary      Stage IIIB ( T3 N1 M0 ) grade 2 moderately differentiated adenocarcinoma of the ascending colon when she presented with abdominal pain, obstruction status post right hemicolectomy in August 2017, 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 [facility] after she became a , she had abdominal pain in August 2017 requiring admission to the hospital, CT scan showed obstructing annular mass with the mid descending colon no evidence of distant metastasis,colonoscopy showed right-sided mucinous adenocarcinoma, status post right hemicolectomy on 08/18/2017 showed adenocarcinoma of the right colon, moderately differentiated, extends through muscularis propria, negative margins, with foci suspicious for perineural invasion, 1/17 positive lymph nodes, stage IIIB ( T3, N1, M0 ), no evidence of Saldaña syndrome medical history significant for severe emphysema, possible osteoporosis used to smoke heavily in the past, she drinks wine 1-2 glasses every night history noncontributory          Review of Systems        Constitutional: recent 2 lb weight loss, but-- no fever,-- no chills-- and-- not feeling tired  Eyes: No complaints of eye pain, no red eyes, no eyesight problems, no discharge, no dry eyes, no itching of eyes  ENT: no complaints of earache, no loss of hearing, no nose bleeds, no nasal discharge, no sore throat, no hoarseness  Cardiovascular: No complaints of slow heart rate, no fast heart rate, no chest pain, no palpitations, no leg claudication, no lower extremity edema  Respiratory: cough-- and-- shortness of breath during exertion        Gastrointestinal: no abdominal pain,-- no vomiting-- and-- no blood in stools  Genitourinary: No complaints of dysuria, no incontinence, no pelvic pain, no dysmenorrhea, no vaginal discharge or bleeding  Musculoskeletal: No complaints of arthralgias, no myalgias, no joint swelling or stiffness, no limb pain or swelling  Integumentary: No complaints of skin rash or lesions, no itching, no skin wounds, no breast pain or lump  Neurological: No complaints of headache, no confusion, no convulsions, no numbness, no dizziness or fainting, no tingling, no limb weakness, no difficulty walking  Psychiatric: Not suicidal, no sleep disturbance, no anxiety or depression, no change in personality, no emotional problems  Endocrine: No complaints of proptosis, no hot flashes, no muscle weakness, no deepening of the voice, no feelings of weakness  Hematologic/Lymphatic: No complaints of swollen glands, no swollen glands in the neck, does not bleed easily, does not bruise easily  Past Medical History      The active problems and past medical history were reviewed and updated today  Surgical History      The surgical history was reviewed and updated today  Family History      The family history was reviewed and updated today  Social History   The social history was reviewed and updated today  Current Meds      The medication list was reviewed and updated today  Vitals   Vital Signs    Recorded: 86RFI1660 03:44PM   Temperature 97 7 F, Tympanic   Heart Rate 79   Weight 115 lb    O2 Saturation 90     Physical Exam        Constitutional      General appearance: No acute distress, well appearing and well nourished  Eyes      Conjunctiva and lids: No swelling, erythema or discharge  Pupils and irises: Equal, round and reactive to light  Ears, Nose, Mouth, and Throat      External inspection of ears and nose: Normal        Oropharynx: Normal with no erythema, edema, exudate or lesions         Pulmonary      Auscultation of lungs: Clear to auscultation  Cardiovascular      Auscultation of heart: Normal rate and rhythm, normal S1 and S2, without murmurs  Examination of extremities for edema and/or varicosities: Normal        Carotid pulses: Normal        Abdomen      Abdomen: Non-tender, no masses  Liver and spleen: No hepatomegaly or splenomegaly  Lymphatic      Palpation of lymph nodes in neck: No lymphadenopathy  Musculoskeletal      Gait and station: Normal        Digits and nails: Normal without clubbing or cyanosis  Inspection/palpation of joints, bones, and muscles: Normal        Skin      Skin and subcutaneous tissue: Normal without rashes or lesions  Neurologic      Cranial nerves: Cranial nerves 2-12 intact  Reflexes: 2+ and symmetric  Sensation: No sensory loss  Psychiatric      Orientation to person, place, and time: Normal        Mood and affect: Normal            ECOG 0       Results/Data   (1) TISSUE EXAM [id number] 12:28PM [facility], Provider   Test ordered by: Harriet Marcelo      Test Name Result Flag Reference   LAB AP CASE REPORT (Report)     Surgical Pathology Report             Case: [id number]                      Authorizing Provider: Osiel Kuhn MD    Collected:      08/18/2017 1228           Ordering Location:   [facility]    Received:      08/18/2017 [address] [place] [address],[address] Floor Operating Room                               Pathologist:      Tawana Monroe MD                                    Specimen:  Colon, TERMINAL ILEUM AND ASCENDING COLON   LAB AP FINAL DIAGNOSIS (Report)     Tumor staging summary (includes part A from this case)          1  Specimen identification:       - Specimen: Terminal ileum and ascending colon       - Procedure: Right hemicolectomy      2   Tumor:       - Tumor site: Ascending colon       - Tumor size: 2 8 cm       - Macroscopic tumor perforation: Absent       - Histologic Type: Adenocarcinoma       - Histologic Grade: Moderately differentiated       - Microscopic Tumor Extension (pT3): Adenocarcinoma extends through      muscularis propria into pericolonic fibroadipose tissue      3  Margins       - Proximal Margin: Negative for carcinoma       - Distal Margin: Negative for carcinoma       - Circumferential (Radial) or Mesenteric Margin: Negative for carcinoma      4  Treatment effect (if neoadjuvant therapy): N/A      5  Lymph-vascular invasion: Present      6  Perineural invasion: Foci suspicious for perineural invasion      7  Tumor deposits: One (1) pericolonic tumor deposit noted      8  Type of polyp in which invasive carcinoma arose: Cannot assess      9  Regional lymph nodes (pN1a): Metastatic adenocarcinoma involving one      (1) of seventeen (17) lymph nodes     10  Additional pathologic findings: None     11  Ancillary Studies: Mismatch repair protein panel pending     12  7th Ed AJCC Stage: at least Stage IIIB - pT3, pN1a, G2               Final diagnosis listed by specimen          A  Terminal ileum and ascending colon (right hemicolectomy):       - Moderately differentiated adenocarcinoma, extending through      muscularis propria into pericolonic fibroadipose tissue        - Metastatic adenocarcinoma involving one (1) of seventeen (17) lymph      nodes  - One (1) pericolonic tumor deposit noted  - Surgical margins negative for carcinoma  - Mismatch repair protein panel pending        - Appendix with no significant pathologic abnormality  Electronically signed by Avel Jama MD on 8/24/2017 at 12:35 PM   LAB AP MICROSCOPIC DESCRIPTION      Adenocarcinoma extends to within approximately 0 1 cm of the peritoneal      surface  LAB AP NOTE      Interpretation performed at [facility], [address] [place] [address]  Intradepartmental consultation concurs with the diagnosis     LAB AP SURGICAL ADDITIONAL INFORMATION (Report)     All controls performed with the immunohistochemical stains reported above      reacted appropriately  These tests were developed and their performance      characteristics determined by [facility] or      [facility]  They may not be cleared or approved by the [place]  Food and Drug Administration  The FDA has determined that such clearance      or approval is not necessary  These tests are used for clinical purposes  They should not be regarded as investigational or for research  This      laboratory has been approved by [facility], designated as a high-complexity      laboratory and is qualified to perform these tests  LAB AP GROSS DESCRIPTION (Report)     A  The specimen is received in formalin, labeled with the patient's name      and hospital number, and is designated terminal ileum and ascending      colon  The specimen consists of a portion of terminal ileum measuring 11      cm in length by 3 cm in circumference contiguous with a portion of large      bowel measuring 19 cm in length which ranges in circumference from 2-7 cm  The serosa with attached fat appears unremarkable  An appendix is present,      measuring 3 4 cm in length by 0 5 cm in average diameter  Upon cut section      the appendix appears unremarkable  The bowel mucosa in the ascending colon      exhibits a circumferential disc-shaped centrally ulcerated lesion      measuring 2 8 x 2 8 x 0 4 cm  Gross photographs are taken  The radial      surface opposite the lesion is inked black  The lesion is located 11 cm      from the distal margin, 16 cm from the proximal margin, 6 cm from the      vascular pedicle margin and 1 5 cm from the nearest mesenteric margin  Upon cut section the lesion appears to invade through the muscularis      propria into the surrounding adipose tissue but does not extend to the      inked radial surface  No other abnormalities are noted   Examination of the      attached adipose tissue yields 16 densities suggestive of lymph nodes      ranging in size from 0 1-1 2 cm each  Representative sections  Twenty      cassettes  1: Proximal margin     2: Distal margin     3: Mesenteric margin nearest lesion     4: Vascular pedicle margin     5: Appendix     6-10: Representative sections of lesion showing greatest depth of      invasion, inked radial surface present in each cassette     11-16: 2 lymph nodes in each cassette     17-20: 1 lymph node in each cassette          Note: The estimated total formalin fixation time based upon information      provided by the submitting clinician and the standard processing schedule      is over 72 hours  MAC   LAB AP CLINICAL INFORMATION      Malignant neoplasm of ascending colon   LAB AP ADDENDUM 1 (Report)     RESULTS OF IMMUNOHISTOCHEMICAL ANALYSIS FOR MISMATCH REPAIR PROTEIN LOSS          INTERPRETATION: NO LOSS OF NUCLEAR EXPRESSION OF MMR PROTEINS: LOW      PROBABILITY OF MSI-H           RESULTS:     Antibody        Clone          Description   Results     MLH1 [id number] Mismatch repair protein  Expressed     MSH2 [id number] Mismatch repair protein  Expressed     MSH6 40 Mismatch repair protein  Expressed     PMS2 MRQ-28      Mismatch repair protein  Expressed (weak)          Comment:      A negative control and a positive control for each antibody have been      reviewed and accepted  [facility] Specimen ID: [id number]     Evaluator: Nadiya Murphy MD          These tests were developed and their performance characteristics      determined by [facility]  They may not be cleared or      approved by the [place]  Food and Drug Administration  The FDA determined      that such clearance or approval is not necessary  These tests are used      for clinical purposes  They should not be regarded as investigational or      for research   This laboratory has been approved by CLIA 88, designated as      a high-complexity laboratory and is qualified to perform these tests  Comments:      Patients whose tumors demonstrate lack of expression of one or more DNA      mismatch repair proteins might be at risk for Saldaña Syndrome  This cancer      susceptibility syndrome greatly increases the risk of synchronous and/or      metachronous cancers in the affected patients and their family members  Normal expression of all proteins does not completely rule out familial      cancer predisposition  The [employer] Program Task Forces recommends      that all patients with lack of expression of one or more DNA mismatch      repair proteins and those with concerning personal or family history      should undergo thorough evaluation, counseling and possibly genetic      testing  Please contact Taina Klein, [address] [place] [address] ([facility] Certified Genetic Counselor)      at [phone] if you have questions or wish to schedule an appointment      for this patient  Addendum electronically signed by French Blankenship MD on 8/28/2017 at 11:40 AM      Future Appointments      Date/Time Provider Specialty Site   02/26/2018 03:00 PM TINO Arriola  Hematology Oncology [facility]     Signatures    Electronically signed by :  TINO Carmen ; Dec 29 2017  5:06PM EST                       (Author)

## 2018-01-23 VITALS — BODY MASS INDEX: 21.03 KG/M2 | OXYGEN SATURATION: 90 % | TEMPERATURE: 97.7 F | HEART RATE: 79 BPM | WEIGHT: 115 LBS

## 2018-01-23 NOTE — CONSULTS
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[facility] after she became a , she had abdominal pain in August 2017 requiring admission to the hospital, CT scan showed obstructing annular mass with the mid descending colon no evidence of distant metastasis,colonoscopy showed right-sided mucinous adenocarcinoma, status post right hemicolectomy on 08/18/2017 showed adenocarcinoma of the right colon, moderately differentiated, extends through muscularis propria, negative margins, with foci suspicious for perineural invasion, 1/17 positive lymph nodes, stage IIIB ( T3, N1, M0 ), no evidence of Saldaña syndrome  Past medical history significant for severe emphysema, possible osteoporosis  She used to smoke heavily in the past, she drinks wine 1-2 glasses every night  family history noncontributory       Review of Systems    Constitutional: recent 2 lb weight loss, but no fever, no chills and not feeling tired  Eyes: No complaints of eye pain, no red eyes, no eyesight problems, no discharge, no dry eyes, no itching of eyes  ENT: no complaints of earache, no loss of hearing, no nose bleeds, no nasal discharge, no sore throat, no hoarseness  Cardiovascular: No complaints of slow heart rate, no fast heart rate, no chest pain, no palpitations, no leg claudication, no lower extremity edema  Respiratory: cough and shortness of breath during exertion  Gastrointestinal: no abdominal pain, no vomiting and no blood in stools  Genitourinary: No complaints of dysuria, no incontinence, no pelvic pain, no dysmenorrhea, no vaginal discharge or bleeding  Musculoskeletal: No complaints of arthralgias, no myalgias, no joint swelling or stiffness, no limb pain or swelling  Integumentary: No complaints of skin rash or lesions, no itching, no skin wounds, no breast pain or lump     Neurological: No complaints of headache, no confusion, no convulsions, no numbness, no dizziness or fainting, no tingling, no limb weakness, no difficulty walking  Psychiatric: Not suicidal, no sleep disturbance, no anxiety or depression, no change in personality, no emotional problems  Endocrine: No complaints of proptosis, no hot flashes, no muscle weakness, no deepening of the voice, no feelings of weakness  Hematologic/Lymphatic: No complaints of swollen glands, no swollen glands in the neck, does not bleed easily, does not bruise easily  Past Medical History    The active problems and past medical history were reviewed and updated today  Surgical History    The surgical history was reviewed and updated today  Family History    The family history was reviewed and updated today  Social History  The social history was reviewed and updated today  Current Meds    The medication list was reviewed and updated today  Vitals   Recorded: [id number] 03:44PM   Temperature 97 7 F, Tympanic   Heart Rate 79   Weight 115 lb    O2 Saturation 90     Physical Exam    Constitutional   General appearance: No acute distress, well appearing and well nourished  Eyes   Conjunctiva and lids: No swelling, erythema or discharge  Pupils and irises: Equal, round and reactive to light  Ears, Nose, Mouth, and Throat   External inspection of ears and nose: Normal     Oropharynx: Normal with no erythema, edema, exudate or lesions  Pulmonary   Auscultation of lungs: Clear to auscultation  Cardiovascular   Auscultation of heart: Normal rate and rhythm, normal S1 and S2, without murmurs  Examination of extremities for edema and/or varicosities: Normal     Carotid pulses: Normal     Abdomen   Abdomen: Non-tender, no masses  Liver and spleen: No hepatomegaly or splenomegaly  Lymphatic   Palpation of lymph nodes in neck: No lymphadenopathy      Musculoskeletal   Gait and station: Normal     Digits and nails: Normal without clubbing or cyanosis  Inspection/palpation of joints, bones, and muscles: Normal     Skin   Skin and subcutaneous tissue: Normal without rashes or lesions  Neurologic   Cranial nerves: Cranial nerves 2-12 intact  Reflexes: 2+ and symmetric  Sensation: No sensory loss  Psychiatric   Orientation to person, place, and time: Normal     Mood and affect: Normal         ECOG 0       Results/Data  (1) TISSUE EXAM [id number] 12:28PM EPIC, Provider   Test ordered by: Radha Gist     Test Name Result Flag Reference   LAB AP CASE REPORT (Report)     Surgical Pathology Report             Case: [id number]                   Authorizing Provider: Deepak Colmenares MD    Collected:      08/18/2017 1228        Ordering Location:   [facility]    Received:      08/18/2017 15[address] [facility], [address] Operating Room                            Pathologist:      Maryanne Gomez MD                                 Specimen:  Colon, TERMINAL ILEUM AND ASCENDING COLON   LAB AP FINAL DIAGNOSIS (Report)     Tumor staging summary (includes part A from this case)    1  Specimen identification:    - Specimen: Terminal ileum and ascending colon    - Procedure: Right hemicolectomy   2  Tumor:    - Tumor site: Ascending colon    - Tumor size: 2 8 cm    - Macroscopic tumor perforation: Absent    - Histologic Type: Adenocarcinoma    - Histologic Grade: Moderately differentiated    - Microscopic Tumor Extension (pT3): Adenocarcinoma extends through   muscularis propria into pericolonic fibroadipose tissue   3  Margins    - Proximal Margin: Negative for carcinoma    - Distal Margin: Negative for carcinoma    - Circumferential (Radial) or Mesenteric Margin: Negative for carcinoma   4  Treatment effect (if neoadjuvant therapy): N/A   5  Lymph-vascular invasion: Present   6  Perineural invasion: Foci suspicious for perineural invasion   7  Tumor deposits: One (1) pericolonic tumor deposit noted   8   Type of polyp in which invasive carcinoma arose: Cannot assess   9  Regional lymph nodes (pN1a): Metastatic adenocarcinoma involving one   (1) of seventeen (17) lymph nodes  10  Additional pathologic findings: None  11  Ancillary Studies: Mismatch repair protein panel pending  12  7th Ed AJCC Stage: at least Stage IIIB - pT3, pN1a, G2      Final diagnosis listed by specimen    A  Terminal ileum and ascending colon (right hemicolectomy):    - Moderately differentiated adenocarcinoma, extending through   muscularis propria into pericolonic fibroadipose tissue     - Metastatic adenocarcinoma involving one (1) of seventeen (17) lymph   nodes  - One (1) pericolonic tumor deposit noted  - Surgical margins negative for carcinoma  - Mismatch repair protein panel pending     - Appendix with no significant pathologic abnormality  Electronically signed by Hayden Yarbrough MD on 8/24/2017 at 12:35 PM   LAB AP MICROSCOPIC DESCRIPTION      Adenocarcinoma extends to within approximately 0 1 cm of the peritoneal   surface  LAB AP NOTE      Interpretation performed at [facility], [address] [place] [address], [address] [place] [address]  Intradepartmental consultation concurs with the diagnosis  LAB AP SURGICAL ADDITIONAL INFORMATION (Report)     All controls performed with the immunohistochemical stains reported above   reacted appropriately  These tests were developed and their performance   characteristics determined by [facility] or   [facility]  They may not be cleared or approved by the [place]  Food and Drug Administration  The FDA has determined that such clearance   or approval is not necessary  These tests are used for clinical purposes  They should not be regarded as investigational or for research  This   laboratory has been approved by CLIA 88, designated as a high-complexity   laboratory and is qualified to perform these tests  LAB AP GROSS DESCRIPTION (Report)     A   The specimen is received in formalin, labeled with the patient's name   and hospital number, and is designated terminal ileum and ascending   colon  The specimen consists of a portion of terminal ileum measuring 11   cm in length by 3 cm in circumference contiguous with a portion of large   bowel measuring 19 cm in length which ranges in circumference from 2-7 cm  The serosa with attached fat appears unremarkable  An appendix is present,   measuring 3 4 cm in length by 0 5 cm in average diameter  Upon cut section   the appendix appears unremarkable  The bowel mucosa in the ascending colon   exhibits a circumferential disc-shaped centrally ulcerated lesion   measuring 2 8 x 2 8 x 0 4 cm  Gross photographs are taken  The radial   surface opposite the lesion is inked black  The lesion is located 11 cm   from the distal margin, 16 cm from the proximal margin, 6 cm from the   vascular pedicle margin and 1 5 cm from the nearest mesenteric margin  Upon cut section the lesion appears to invade through the muscularis   propria into the surrounding adipose tissue but does not extend to the   inked radial surface  No other abnormalities are noted  Examination of the   attached adipose tissue yields 16 densities suggestive of lymph nodes   ranging in size from 0 1-1 2 cm each  Representative sections  Twenty   cassettes  1: Proximal margin  2: Distal margin  3: Mesenteric margin nearest lesion  4: Vascular pedicle margin  5: Appendix  6-10: Representative sections of lesion showing greatest depth of   invasion, inked radial surface present in each cassette  11-16: 2 lymph nodes in each cassette  17-20: 1 lymph node in each cassette    Note: The estimated total formalin fixation time based upon information   provided by the submitting clinician and the standard processing schedule   is over 72 hours      MAC   LAB AP CLINICAL INFORMATION      Malignant neoplasm of ascending colon   LAB AP ADDENDUM 1 (Report)     RESULTS OF IMMUNOHISTOCHEMICAL ANALYSIS FOR MISMATCH REPAIR PROTEIN LOSS    INTERPRETATION: NO LOSS OF NUCLEAR EXPRESSION OF MMR PROTEINS: LOW   PROBABILITY OF MSI-H     RESULTS:  Antibody        Clone          Description   Results  MLH1 [id number] Mismatch repair protein  Expressed  MSH2 [id number] Mismatch repair protein  Expressed  MSH6 40 Mismatch repair protein  Expressed  PMS2 MRQ-28      Mismatch repair protein  Expressed (weak)    Comment:   A negative control and a positive control for each antibody have been   reviewed and accepted  [facility] Specimen ID: [id number]  Evaluator: Dusty Bernheim, MD    These tests were developed and their performance characteristics   determined by [facility]  They may not be cleared or   approved by the [place]  Food and Drug Administration  The FDA determined   that such clearance or approval is not necessary  These tests are used   for clinical purposes  They should not be regarded as investigational or   for research  This laboratory has been approved by [place] [address], designated as   a high-complexity laboratory and is qualified to perform these tests  Comments:   Patients whose tumors demonstrate lack of expression of one or more DNA   mismatch repair proteins might be at risk for Saldaña Syndrome  This cancer   susceptibility syndrome greatly increases the risk of synchronous and/or   metachronous cancers in the affected patients and their family members  Normal expression of all proteins does not completely rule out familial   cancer predisposition  The [employer] Program Task Forces recommends   that all patients with lack of expression of one or more DNA mismatch   repair proteins and those with concerning personal or family history   should undergo thorough evaluation, counseling and possibly genetic   testing  Please contact Anne Marie Monday, [address] ([address] Certified Genetic Counselor)   at [phone] if you have questions or wish to schedule an appointment   for this patient     Addendum electronically signed by Zully Ellis MD on 2017 at 11:40 AM     Assessment    1  Malignant neoplasm of ascending colon (153 6) (C18 2)    Plan  Malignant neoplasm of ascending colon    · (1) CBC/PLT/DIFF; Status:Active; Requested for:[id number];    Perform:[facility]; PXO:[id number]; Ordered; For:Malignant neoplasm of ascending colon; Ordered By:Faroun, Ludwig Pon [facility]);   · (1) CEA; Status:Active; Requested for:[id number];    Perform:[facility]; LPP:[id number]; Ordered; For:Malignant neoplasm of ascending colon; Ordered By:Faroun, Ludwig Pon [facility]);   · (1) COMPREHENSIVE METABOLIC PANEL; Status:Active; Requested for:73Zmk6616;    Perform:[facility]; [id number]; Ordered; For:Malignant neoplasm of ascending colon; Ordered By:Faroun, Ludwig Pon [facility]);   · * CT ABDOMEN PELVIS W CONTRAST; Status:Need Information - Financial Authorization; Requested for:[id number] 04:00PM;    Perform:[facility]; Order Comments:**No Oral Contrast**  [address]  18 at 4pm  Arrive 15 minutes early  NO FOOD 3 hours prior ; Due:96Yva8818; Last Updated By:Yolanda Rodrigez; 2017 4:18:45 PM;Ordered; For:Malignant neoplasm of ascending colon; Ordered By:Faroun, Ludwig Pon [facility]); · Follow-up visit in 2 months Evaluation and Treatment  Follow-up  Status: Complete   Done: [id number]   Ordered; For: Malignant neoplasm of ascending colon; Ordered By: Delvis Frances Performed:  Due: 81VMY2518;  Last Updated By: Divine Astudillo; 2017 4:20:59 PM    Discussion/Summary    Stage IIIB ( T3 N1 M0 ) grade 2 moderately differentiated adenocarcinoma of the ascending colon when she presented with abdominal pain, obstruction status post right hemicolectomy in 2017, 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 adjuvant chemotherapy ( FOLFOX she told me)  she came today with her daughter for discussion We talked about adjuvant chemotherapy such as FOLFOX for 3 month especially with 1 positive lymph node we talked about Xeloda adjuvant chemotherapy as well  The traditional disease specific free survival at 5 years is 47% according to MD [facility] calculation, without adjuvant chemotherapy  The patient decided to watch and observe and not willing for adjuvant chemotherapy   Follow-up in 2 months with CBC, CMP, CEA and CT scan of the abdomen and pelvis and then every 6 months with CBC, CMP, CEA and CT scan annually      Signatures   Electronically signed by :  TINO Ayon ; Dec 29 2017  5:06PM EST                       (Author)

## 2018-02-21 ENCOUNTER — HOSPITAL ENCOUNTER (OUTPATIENT)
Dept: CT IMAGING | Facility: HOSPITAL | Age: 83
Discharge: HOME | End: 2018-02-21
Attending: INTERNAL MEDICINE
Payer: COMMERCIAL

## 2018-02-21 DIAGNOSIS — C18.2 MALIGNANT NEOPLASM OF ASCENDING COLON (HCC): ICD-10-CM

## 2018-02-21 PROCEDURE — 74177 CT ABD & PELVIS W/CONTRAST: CPT

## 2018-02-21 RX ADMIN — IOHEXOL 100 ML: 350 INJECTION, SOLUTION INTRAVENOUS at 16:24

## 2018-02-26 ENCOUNTER — OFFICE VISIT (OUTPATIENT)
Dept: HEMATOLOGY ONCOLOGY | Facility: CLINIC | Age: 83
End: 2018-02-26
Payer: COMMERCIAL

## 2018-02-26 VITALS
HEART RATE: 81 BPM | OXYGEN SATURATION: 91 % | WEIGHT: 116 LBS | BODY MASS INDEX: 21.35 KG/M2 | RESPIRATION RATE: 16 BRPM | TEMPERATURE: 97.9 F | SYSTOLIC BLOOD PRESSURE: 120 MMHG | DIASTOLIC BLOOD PRESSURE: 84 MMHG | HEIGHT: 62 IN

## 2018-02-26 DIAGNOSIS — C18.7 MALIGNANT NEOPLASM OF SIGMOID COLON (HCC): Primary | ICD-10-CM

## 2018-02-26 PROCEDURE — 99214 OFFICE O/P EST MOD 30 MIN: CPT | Performed by: INTERNAL MEDICINE

## 2018-02-26 NOTE — PROGRESS NOTES
Hematology Outpatient Follow - Up Note  Emiliana Morales 80 y o  female MRN: @ Encounter: [id number]        Date:  2/26/2018        Assessment/ Plan:   History of stage IIIB adenocarcinoma of the ascending colon when she has she presented with abdominal obstruction, anemia status post resection in August 2017 ( T3 N1 M0), with 1 positive lymph nodes, she declined adjuvant chemotherapy even Xeloda, she decided to continue on watchful observation, most recent CT scan showed no evidence of disease, the patient decided not to proceed with any CT scans anymore, will follow-up in 4-6 months with CBC, CMP, CEA, most likely she will have recurrence in the near future, she is aware of that           HPI:  55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 , she had abdominal pain in August 2017 requiring admission to the hospital, CT scan showed obstructing annular mass with the mid descending colon no evidence of distant metastasis,colonoscopy showed right-sided mucinous adenocarcinoma, status post right hemicolectomy on 08/18/2017 showed adenocarcinoma of the right colon, moderately differentiated, extends through muscularis propria, negative margins, with foci suspicious for perineural invasion, 1/17 positive lymph nodes, stage IIIB ( T3, N1, M0 ), no evidence of Saldaña syndrome medical history significant for severe emphysema, possible osteoporosis used to smoke heavily in the past, she drinks wine 1-2 glasses every night history noncontributory     Interval History:  No changes from the previous visit      Previous Treatment:         Test Results:   In February 2018 CEA 4 1, AST 18, ALT 16, bilirubin 1 3, alkaline phosphatase 69, albumin 3 7, creatinine 1, glucose 89, hemoglobin 15 4, WBC 6 5, platelets [id number], MCV 94    Imaging: Ct Abdomen Pelvis W Contrast    Result Date: 2/24/2018  Narrative: CT ABDOMEN AND PELVIS WITH IV CONTRAST INDICATION: C18 2: Malignant neoplasm of ascending colon COMPARISON: 8/15/2017 TECHNIQUE:  CT examination of the abdomen and pelvis was performed  Axial, sagittal, and coronal 2D reformatted images were created from the source data and submitted for interpretation  Radiation dose length product (DLP) for this visit:  327 mGy-cm   This examination, like all CT scans performed in the [facility], was performed utilizing techniques to minimize radiation dose exposure, including the use of iterative reconstruction and automated exposure control  IV Contrast:  100 mL of iohexol (OMNIPAQUE) Enteric Contrast:  Enteric contrast was not administered  FINDINGS: ABDOMEN LOWER CHEST:  Basilar emphysematous changes identified  Mild right basilar honeycombing suspected  LIVER/BILIARY TREE:  One or more subcentimeter sharply circumscribed low-density hepatic lesion(s) are noted, too small to accurately characterize, but statistically most likely to represent subcentimeter hepatic cysts  No suspicious solid hepatic lesion is identified  Hepatic contours are normal   No biliary dilatation  GALLBLADDER:  Gallbladder is surgically absent  SPLEEN:  Unremarkable  PANCREAS:  Unremarkable  ADRENAL GLANDS:  Unremarkable  KIDNEYS/URETERS:  One or more simple renal cyst(s) is noted  Otherwise unremarkable kidneys  No hydronephrosis  STOMACH AND BOWEL:  Sigmoid diverticula noted  There are clips associated with the ascending colon/hepatic flexure region correlating to the history of colon cancer with resection  No definite adjacent extraluminal gas or collection  No definite mesenteric adenopathy APPENDIX:  No findings to suggest appendicitis  ABDOMINOPELVIC CAVITY:  No ascites or free intraperitoneal air  No lymphadenopathy  VESSELS:  Atherosclerotic calcifications noted  PELVIS REPRODUCTIVE ORGANS:  Unremarkable for patient's age  URINARY BLADDER:  Unremarkable  ABDOMINAL WALL/INGUINAL REGIONS:  Unremarkable   OSSEOUS STRUCTURES:  No acute fracture or destructive osseous lesion  Impression: 1  No evidence of metastatic disease in the pelvis  Interval resection of the annular mass in the region of the distal ascending colon  2   Emphysema 3  Colonic diverticulosis in the sigmoid colon Workstation performed: [id number]       Labs:   Lab Results   Component Value Date    WBC 11 37 (H) 08/19/2017    HGB 13 6 08/19/2017    HCT 41 9 08/19/2017    MCV 95 08/19/2017     08/19/2017     Lab Results   Component Value Date     08/19/2017    K 3 3 (L) 08/19/2017     08/19/2017    CO2 24 08/19/2017    ANIONGAP 11 08/19/2017    BUN 5 08/19/2017    CREATININE 0 73 08/19/2017    GLUCOSE 104 08/19/2017    CALCIUM 7 9 (L) 08/19/2017    AST 38 08/14/2017    ALT 21 08/14/2017    ALKPHOS 63 08/14/2017    PROT 8 2 08/14/2017    BILITOT 1 30 (H) 08/14/2017    EGFR 77 08/19/2017       No results found for: IRON, TIBC, FERRITIN    No results found for: YVINDRJS21      ROS:   Review of Systems   All other systems reviewed and are negative  Current Medications: Reviewed  Allergies: Reviewed  PMH/FH/SH:  Reviewed      Physical Exam:    Body surface area is 1 52 meters squared  Wt Readings from Last 3 Encounters:   02/26/18 52 6 kg (116 lb)   12/29/17 52 2 kg (115 lb)   08/18/17 54 4 kg (120 lb)        Temp Readings from Last 3 Encounters:   02/26/18 97 9 °F (36 6 °C) (Tympanic)   12/29/17 97 7 °F (36 5 °C) (Tympanic)   08/22/17 99 1 °F (37 3 °C) (Oral)        BP Readings from Last 3 Encounters:   02/26/18 120/84   08/22/17 110/80         Pulse Readings from Last 3 Encounters:   02/26/18 81   12/29/17 79   08/22/17 81        Physical Exam   Constitutional: She is oriented to person, place, and time  She appears well-developed  No distress  HENT:   Head: Normocephalic and atraumatic  Mouth/Throat: Oropharynx is clear and moist  No oropharyngeal exudate  Eyes: Conjunctivae and EOM are normal  Pupils are equal, round, and reactive to light     Neck: Normal range of motion  Neck supple  No tracheal deviation present  No thyromegaly present  Cardiovascular: Normal rate and regular rhythm  Exam reveals no gallop and no friction rub  No murmur heard  Pulmonary/Chest: Effort normal and breath sounds normal  No respiratory distress  She has no wheezes  She has no rales  She exhibits no tenderness  Abdominal: Soft  Bowel sounds are normal  She exhibits no distension and no mass  There is no tenderness  There is no rebound and no guarding  Musculoskeletal: Normal range of motion  Lymphadenopathy:     She has no cervical adenopathy  Neurological: She is alert and oriented to person, place, and time  Skin: Skin is warm and dry  No rash noted  She is not diaphoretic  No erythema  No pallor  Psychiatric: She has a normal mood and affect  Her behavior is normal  Judgment and thought content normal    Vitals reviewed  Goals and Barriers:  Current Goal: Minimize effects of disease  Barriers: None  Patient's Capacity to Self Care:  Patient is able to self care      Code Status: [unfilled]

## 2018-02-26 NOTE — LETTER
February 26, 2018     Viviane Portillo  [address] [address] [facility]    Patient: Cecilia Anderson   YOB: 1935   Date of Visit: 2/26/2018       Dear Dr Marcial Shah: Thank you for referring Cecilia Anderson to me for evaluation  Below are my notes for this consultation  If you have questions, please do not hesitate to call me  I look forward to following your patient along with you           Sincerely,        Deb Parra MD        CC: No Recipients  Deb Parra MD  2/26/2018  3:42 PM  Sign at close encounter  Hematology Outpatient Follow - Up Note  Cecilia Anderson 80 y o  female MRN: @ Encounter: [id number]        Date:  2/26/2018        Assessment/ Plan:   History of stage IIIB adenocarcinoma of the ascending colon when she has she presented with abdominal obstruction, anemia status post resection in August 2017 ( T3 N1 M0), with 1 positive lymph nodes, she declined adjuvant chemotherapy even Xeloda, she decided to continue on watchful observation, most recent CT scan showed no evidence of disease, the patient decided not to proceed with any CT scans anymore, will follow-up in 4-6 months with CBC, CMP, CEA, most likely she will have recurrence in the near future, she is aware of that           HPI:  28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 , she had abdominal pain in August 2017 requiring admission to the hospital, CT scan showed obstructing annular mass with the mid descending colon no evidence of distant metastasis,colonoscopy showed right-sided mucinous adenocarcinoma, status post right hemicolectomy on 08/18/2017 showed adenocarcinoma of the right colon, moderately differentiated, extends through muscularis propria, negative margins, with foci suspicious for perineural invasion, 1/17 positive lymph nodes, stage IIIB ( T3, N1, M0 ), no evidence of Saldaña syndrome medical history significant for severe emphysema, possible osteoporosis used to smoke heavily in the past, she drinks wine 1-2 glasses every night history noncontributory     Interval History:  No changes from the previous visit      Previous Treatment:         Test Results: In February 2018 CEA 4 1, AST 18, ALT 16, bilirubin 1 3, alkaline phosphatase 69, albumin 3 7, creatinine 1, glucose 89, hemoglobin 15 4, WBC 6 5, platelets [id number], MCV 94    Imaging: Ct Abdomen Pelvis W Contrast    Result Date: 2/24/2018  Narrative: CT ABDOMEN AND PELVIS WITH IV CONTRAST INDICATION: C18 2: Malignant neoplasm of ascending colon COMPARISON: 8/15/2017 TECHNIQUE:  CT examination of the abdomen and pelvis was performed  Axial, sagittal, and coronal 2D reformatted images were created from the source data and submitted for interpretation  Radiation dose length product (DLP) for this visit:  327 mGy-cm   This examination, like all CT scans performed in the [facility], was performed utilizing techniques to minimize radiation dose exposure, including the use of iterative reconstruction and automated exposure control  IV Contrast:  100 mL of iohexol (OMNIPAQUE) Enteric Contrast:  Enteric contrast was not administered  FINDINGS: ABDOMEN LOWER CHEST:  Basilar emphysematous changes identified  Mild right basilar honeycombing suspected  LIVER/BILIARY TREE:  One or more subcentimeter sharply circumscribed low-density hepatic lesion(s) are noted, too small to accurately characterize, but statistically most likely to represent subcentimeter hepatic cysts  No suspicious solid hepatic lesion is identified  Hepatic contours are normal   No biliary dilatation  GALLBLADDER:  Gallbladder is surgically absent  SPLEEN:  Unremarkable  PANCREAS:  Unremarkable  ADRENAL GLANDS:  Unremarkable  KIDNEYS/URETERS:  One or more simple renal cyst(s) is noted  Otherwise unremarkable kidneys  No hydronephrosis  STOMACH AND BOWEL:  Sigmoid diverticula noted    There are clips associated with the ascending colon/hepatic flexure region correlating to the history of colon cancer with resection  No definite adjacent extraluminal gas or collection  No definite mesenteric adenopathy APPENDIX:  No findings to suggest appendicitis  ABDOMINOPELVIC CAVITY:  No ascites or free intraperitoneal air  No lymphadenopathy  VESSELS:  Atherosclerotic calcifications noted  PELVIS REPRODUCTIVE ORGANS:  Unremarkable for patient's age  URINARY BLADDER:  Unremarkable  ABDOMINAL WALL/INGUINAL REGIONS:  Unremarkable  OSSEOUS STRUCTURES:  No acute fracture or destructive osseous lesion  Impression: 1  No evidence of metastatic disease in the pelvis  Interval resection of the annular mass in the region of the distal ascending colon  2   Emphysema 3  Colonic diverticulosis in the sigmoid colon Workstation performed: [id number]       Labs:   Lab Results   Component Value Date    WBC 11 37 (H) 08/19/2017    HGB 13 6 08/19/2017    HCT 41 9 08/19/2017    MCV 95 08/19/2017     08/19/2017     Lab Results   Component Value Date     08/19/2017    K 3 3 (L) 08/19/2017     08/19/2017    CO2 24 08/19/2017    ANIONGAP 11 08/19/2017    BUN 5 08/19/2017    CREATININE 0 73 08/19/2017    GLUCOSE 104 08/19/2017    CALCIUM 7 9 (L) 08/19/2017    AST 38 08/14/2017    ALT 21 08/14/2017    ALKPHOS 63 08/14/2017    PROT 8 2 08/14/2017    BILITOT 1 30 (H) 08/14/2017    EGFR 77 08/19/2017       No results found for: IRON, TIBC, FERRITIN    No results found for: [id number]      ROS:   Review of Systems   All other systems reviewed and are negative  Current Medications: Reviewed  Allergies: Reviewed  PMH/FH/SH:  Reviewed      Physical Exam:    Body surface area is 1 52 meters squared      Wt Readings from Last 3 Encounters:   02/26/18 52 6 kg (116 lb)   12/29/17 52 2 kg (115 lb)   08/18/17 54 4 kg (120 lb)        Temp Readings from Last 3 Encounters:   02/26/18 97 9 °F (36 6 °C) (Tympanic)   12/29/17 97 7 °F (36 5 °C) (Tympanic)   08/22/17 99 1 °F (37 3 °C) (Oral)        BP Readings from Last 3 Encounters:   02/26/18 120/84   08/22/17 110/80         Pulse Readings from Last 3 Encounters:   02/26/18 81   12/29/17 79   08/22/17 81        Physical Exam   Constitutional: She is oriented to person, place, and time  She appears well-developed  No distress  HENT:   Head: Normocephalic and atraumatic  Mouth/Throat: Oropharynx is clear and moist  No oropharyngeal exudate  Eyes: Conjunctivae and EOM are normal  Pupils are equal, round, and reactive to light  Neck: Normal range of motion  Neck supple  No tracheal deviation present  No thyromegaly present  Cardiovascular: Normal rate and regular rhythm  Exam reveals no gallop and no friction rub  No murmur heard  Pulmonary/Chest: Effort normal and breath sounds normal  No respiratory distress  She has no wheezes  She has no rales  She exhibits no tenderness  Abdominal: Soft  Bowel sounds are normal  She exhibits no distension and no mass  There is no tenderness  There is no rebound and no guarding  Musculoskeletal: Normal range of motion  Lymphadenopathy:     She has no cervical adenopathy  Neurological: She is alert and oriented to person, place, and time  Skin: Skin is warm and dry  No rash noted  She is not diaphoretic  No erythema  No pallor  Psychiatric: She has a normal mood and affect  Her behavior is normal  Judgment and thought content normal    Vitals reviewed  Goals and Barriers:  Current Goal: Minimize effects of disease  Barriers: None  Patient's Capacity to Self Care:  Patient is able to self care      Code Status: [unfilled]

## 2018-08-20 ENCOUNTER — TELEPHONE (OUTPATIENT)
Dept: HEMATOLOGY ONCOLOGY | Facility: CLINIC | Age: 83
End: 2018-08-20

## 2018-08-20 NOTE — TELEPHONE ENCOUNTER
PT HAD LABS DRAWN AT [facility] LAST Monday AND Friday  ARE ANY RESULTS AVAILABLE       ALSO, SHE SAW ON HER CHECK-OUT PAPERS THAT IT SAYS HER DIAG IS SIGMOID COLON CA; WHEN IT IS ACTUALLY ACCENDING COLON CA - SHOULD THIS BE CHANGED?     TXS

## 2018-08-21 ENCOUNTER — TELEPHONE (OUTPATIENT)
Dept: HEMATOLOGY ONCOLOGY | Facility: CLINIC | Age: 83
End: 2018-08-21

## 2018-08-21 NOTE — TELEPHONE ENCOUNTER
Stated that she got bw done last week  It was a two part test at [address] [place] [address] that we call her back with the results

## 2018-08-22 ENCOUNTER — TELEPHONE (OUTPATIENT)
Dept: HEMATOLOGY ONCOLOGY | Facility: CLINIC | Age: 83
End: 2018-08-22

## 2018-08-22 NOTE — TELEPHONE ENCOUNTER
Pt called for lab results  Labs done at [facility]  CBC and chem in Care Everywhere but not CEA   Please check with [facility] for CEA and update pt

## 2018-08-22 NOTE — TELEPHONE ENCOUNTER
Pt was called after calling the [facility] lab, they did not do the CEA test when they kurt her blood on the 17th of Aug  Pt decided to reschedule apt with Dr Ne Tamayo for Sept 24th, lab slip is being mailed to her home where she can go to lab at her earliest convenience to get done prior to next appt

## 2018-08-22 NOTE — TELEPHONE ENCOUNTER
Patient calling again for results of her CEA  She says it was drawn on the 13th  Done at [facility]  Please call patient as soon as you get the results  If we do not have them, she will be changing her appointment as she does not want to come in if Dr Brenda Lanza does not have the results

## 2018-08-23 ENCOUNTER — TELEPHONE (OUTPATIENT)
Dept: HEMATOLOGY ONCOLOGY | Facility: CLINIC | Age: 83
End: 2018-08-23

## 2018-08-23 NOTE — TELEPHONE ENCOUNTER
I called pt back to let her know that with those results she will still need to f/u with Dr Tanner Kaufman, and yes she will need to keep her apt for 9/24

## 2018-08-23 NOTE — TELEPHONE ENCOUNTER
MELO Martin Pt called to report she spoke with [facility] and CEA was drawn on 8/13/18 and it is resulted  Called [facility] and CEA 4 6  Requested result be faxed to office  Informed pt of result

## 2018-09-24 ENCOUNTER — OFFICE VISIT (OUTPATIENT)
Dept: HEMATOLOGY ONCOLOGY | Facility: CLINIC | Age: 83
End: 2018-09-24
Payer: COMMERCIAL

## 2018-09-24 VITALS
TEMPERATURE: 96.6 F | DIASTOLIC BLOOD PRESSURE: 70 MMHG | RESPIRATION RATE: 16 BRPM | HEART RATE: 80 BPM | HEIGHT: 62 IN | BODY MASS INDEX: 21.99 KG/M2 | WEIGHT: 119.5 LBS | SYSTOLIC BLOOD PRESSURE: 116 MMHG | OXYGEN SATURATION: 97 %

## 2018-09-24 DIAGNOSIS — C18.2 MALIGNANT NEOPLASM OF ASCENDING COLON (HCC): Primary | ICD-10-CM

## 2018-09-24 PROCEDURE — 99214 OFFICE O/P EST MOD 30 MIN: CPT | Performed by: INTERNAL MEDICINE

## 2018-09-24 NOTE — PROGRESS NOTES
Hematology Outpatient Follow - Up Note  Rogerio Baeza 80 y o  female MRN: @ Encounter: [id number]        Date:  9/24/2018        Assessment/ Plan:  She is 49-year-old female who was diagnosed in August 2017 with obstruction mass in the mid ascending colon status post right hemicolectomy on 08/2017 showed adenocarcinoma the right colon, moderately differentiated with extension into the muscularis propria, stage IIIB ( T3 N1 M0) with 1/17 positive lymph nodes, no evidence of Saldaña syndrome   She decided not to proceed with any adjuvant chemotherapy  I have no evidence of disease by physical examination, CEA 4 6 ( normal less than 6), I will repeat CBC, CMP, CEA in 3 months no need for imaging studies, she agreed on the plan  Polycythemia secondary to previous COPD no need for additional workup           HPI: 49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 , she had abdominal pain in August 2017 requiring admission to the hospital, CT scan showed obstructing annular mass with the mid descending colon no evidence of distant metastasis,colonoscopy showed right-sided mucinous adenocarcinoma, status post right hemicolectomy on 08/18/2017 showed adenocarcinoma of the right colon, moderately differentiated, extends through muscularis propria, negative margins, with foci suspicious for perineural invasion, 1/17 positive lymph nodes, stage IIIB ( T3, N1, M0 ), no evidence of Saldaña syndrome      Interval History:        Previous Treatment:         Test Results:    Imaging: No results found      Labs:    CEA 4 6 hemoglobin 15 9, WBC 7, platelets [id number], , 55% neutrophils, 32% lymphocytes creatinine 0 9, AST 16, ALT 15, total protein 7 3, bilirubin 1 5, albumin 3 9, alkaline phosphatase 56, calcium 9      No results found for: IRON, TIBC, FERRITIN    No results found for: [id number]      ROS:   Review of Systems   Constitutional: Negative for activity change, appetite change, diaphoresis, fatigue, fever and unexpected weight change  HENT: Negative for facial swelling, hearing loss, rhinorrhea, sinus pain, sinus pressure, sneezing, sore throat and tinnitus  Eyes: Negative for photophobia, pain, discharge, redness, itching and visual disturbance  Respiratory: Negative for apnea and chest tightness  Cardiovascular: Negative for chest pain, palpitations and leg swelling  Gastrointestinal: Negative for abdominal distention, abdominal pain, blood in stool, constipation, diarrhea, nausea, rectal pain and vomiting  Endocrine: Negative for cold intolerance, heat intolerance, polydipsia and polyphagia  Genitourinary: Negative for difficulty urinating, dyspareunia, frequency, hematuria, pelvic pain and urgency  Musculoskeletal: Negative for arthralgias, back pain, gait problem, joint swelling and myalgias  Skin: Negative for color change, pallor and rash  Allergic/Immunologic: Negative for environmental allergies and food allergies  Neurological: Negative for dizziness, tremors, seizures, syncope, speech difficulty, numbness and headaches  Hematological: Negative for adenopathy  Does not bruise/bleed easily  Psychiatric/Behavioral: Negative for agitation, confusion, dysphoric mood, hallucinations and suicidal ideas  Current Medications: Reviewed  Allergies: Reviewed  PMH/FH/SH:  Reviewed      Physical Exam:    Body surface area is 1 54 meters squared      Wt Readings from Last 3 Encounters:   09/24/18 54 2 kg (119 lb 8 oz)   02/26/18 52 6 kg (116 lb)   12/29/17 52 2 kg (115 lb)        Temp Readings from Last 3 Encounters:   09/24/18 (!) 96 6 °F (35 9 °C) (Tympanic)   02/26/18 97 9 °F (36 6 °C) (Tympanic)   12/29/17 97 7 °F (36 5 °C) (Tympanic)        BP Readings from Last 3 Encounters:   09/24/18 116/70   02/26/18 120/84   08/22/17 110/80         Pulse Readings from Last 3 Encounters:   09/24/18 80   02/26/18 81   12/29/17 79        Physical Exam Constitutional: She is oriented to person, place, and time  She appears well-developed and well-nourished  No distress  HENT:   Head: Normocephalic and atraumatic  Mouth/Throat: Oropharynx is clear and moist  No oropharyngeal exudate  Eyes: Conjunctivae and EOM are normal  Pupils are equal, round, and reactive to light  Neck: Normal range of motion  Neck supple  No tracheal deviation present  No thyromegaly present  Cardiovascular: Normal rate and regular rhythm  Exam reveals no gallop and no friction rub  No murmur heard  Pulmonary/Chest: Effort normal  No respiratory distress  She has no wheezes  She has no rales  She exhibits no tenderness  Distant breath sounds bilateral   Abdominal: Soft  Bowel sounds are normal  She exhibits no distension and no mass  There is no tenderness  There is no rebound and no guarding  Musculoskeletal: Normal range of motion  She exhibits no edema  Acral cyanosis   Lymphadenopathy:     She has no cervical adenopathy  Neurological: She is alert and oriented to person, place, and time  Skin: Skin is warm and dry  No rash noted  She is not diaphoretic  No erythema  No pallor  Psychiatric: She has a normal mood and affect  Her behavior is normal  Judgment and thought content normal    Vitals reviewed  Goals and Barriers:  Current Goal: Minimize effects of disease  Barriers: None  Patient's Capacity to Self Care:  Patient is able to self care      Code Status: [unfilled]

## 2018-12-11 ENCOUNTER — TELEPHONE (OUTPATIENT)
Dept: HEMATOLOGY ONCOLOGY | Facility: CLINIC | Age: 83
End: 2018-12-11

## 2018-12-11 NOTE — TELEPHONE ENCOUNTER
Pt was called to schedule a f/u for January, pt did have one for 12/31 but Dr Stefany Crawford is out of office that day

## 2019-01-04 ENCOUNTER — HOSPITAL ENCOUNTER (INPATIENT)
Facility: HOSPITAL | Age: 84
LOS: 6 days | Discharge: HOME HEALTH | DRG: 193 | End: 2019-01-10
Attending: EMERGENCY MEDICINE | Admitting: HOSPITALIST
Payer: COMMERCIAL

## 2019-01-04 ENCOUNTER — APPOINTMENT (EMERGENCY)
Dept: RADIOLOGY | Facility: HOSPITAL | Age: 84
DRG: 193 | End: 2019-01-04
Payer: COMMERCIAL

## 2019-01-04 DIAGNOSIS — J96.01 ACUTE RESPIRATORY FAILURE WITH HYPOXIA (HCC): ICD-10-CM

## 2019-01-04 DIAGNOSIS — J06.9 UPPER RESPIRATORY INFECTION: Primary | ICD-10-CM

## 2019-01-04 DIAGNOSIS — J44.9 COPD (CHRONIC OBSTRUCTIVE PULMONARY DISEASE) (HCC): ICD-10-CM

## 2019-01-04 DIAGNOSIS — R09.02 HYPOXIA: ICD-10-CM

## 2019-01-04 PROBLEM — J22 ACUTE LOWER RESPIRATORY INFECTION: Status: ACTIVE | Noted: 2019-01-04

## 2019-01-04 PROBLEM — A41.9 SEVERE SEPSIS (HCC): Status: ACTIVE | Noted: 2019-01-04

## 2019-01-04 PROBLEM — R65.20 SEVERE SEPSIS (HCC): Status: ACTIVE | Noted: 2019-01-04

## 2019-01-04 LAB
ALBUMIN SERPL BCP-MCNC: 3.8 G/DL (ref 3.5–5)
ALP SERPL-CCNC: 73 U/L (ref 46–116)
ALT SERPL W P-5'-P-CCNC: 18 U/L (ref 12–78)
ANION GAP SERPL CALCULATED.3IONS-SCNC: 11 MMOL/L (ref 4–13)
APTT PPP: 38 SECONDS (ref 26–38)
AST SERPL W P-5'-P-CCNC: 39 U/L (ref 5–45)
BASOPHILS # BLD AUTO: 0.05 THOUSANDS/ΜL (ref 0–0.1)
BASOPHILS NFR BLD AUTO: 1 % (ref 0–1)
BILIRUB SERPL-MCNC: 1.6 MG/DL (ref 0.2–1)
BUN SERPL-MCNC: 13 MG/DL (ref 5–25)
CALCIUM SERPL-MCNC: 9.2 MG/DL (ref 8.3–10.1)
CHLORIDE SERPL-SCNC: 99 MMOL/L (ref 100–108)
CO2 SERPL-SCNC: 28 MMOL/L (ref 21–32)
CREAT SERPL-MCNC: 1.02 MG/DL (ref 0.6–1.3)
EOSINOPHIL # BLD AUTO: 0.01 THOUSAND/ΜL (ref 0–0.61)
EOSINOPHIL NFR BLD AUTO: 0 % (ref 0–6)
ERYTHROCYTE [DISTWIDTH] IN BLOOD BY AUTOMATED COUNT: 13 % (ref 11.6–15.1)
GFR SERPL CREATININE-BSD FRML MDRD: 51 ML/MIN/1.73SQ M
GLUCOSE SERPL-MCNC: 122 MG/DL (ref 65–140)
HCT VFR BLD AUTO: 51.3 % (ref 34.8–46.1)
HGB BLD-MCNC: 17 G/DL (ref 11.5–15.4)
IMM GRANULOCYTES # BLD AUTO: 0.04 THOUSAND/UL (ref 0–0.2)
IMM GRANULOCYTES NFR BLD AUTO: 1 % (ref 0–2)
INR PPP: 1.06 (ref 0.86–1.17)
LACTATE SERPL-SCNC: 2.5 MMOL/L (ref 0.5–2)
LACTATE SERPL-SCNC: 2.8 MMOL/L (ref 0.5–2)
LYMPHOCYTES # BLD AUTO: 0.78 THOUSANDS/ΜL (ref 0.6–4.47)
LYMPHOCYTES NFR BLD AUTO: 11 % (ref 14–44)
MCH RBC QN AUTO: 33.7 PG (ref 26.8–34.3)
MCHC RBC AUTO-ENTMCNC: 33.1 G/DL (ref 31.4–37.4)
MCV RBC AUTO: 102 FL (ref 82–98)
MONOCYTES # BLD AUTO: 0.65 THOUSAND/ΜL (ref 0.17–1.22)
MONOCYTES NFR BLD AUTO: 9 % (ref 4–12)
NEUTROPHILS # BLD AUTO: 5.4 THOUSANDS/ΜL (ref 1.85–7.62)
NEUTS SEG NFR BLD AUTO: 78 % (ref 43–75)
NRBC BLD AUTO-RTO: 0 /100 WBCS
PLATELET # BLD AUTO: 157 THOUSANDS/UL (ref 149–390)
PLATELET # BLD AUTO: 195 THOUSANDS/UL (ref 149–390)
PMV BLD AUTO: 8.5 FL (ref 8.9–12.7)
PMV BLD AUTO: 8.5 FL (ref 8.9–12.7)
POTASSIUM SERPL-SCNC: 3.7 MMOL/L (ref 3.5–5.3)
PROT SERPL-MCNC: 8.6 G/DL (ref 6.4–8.2)
PROTHROMBIN TIME: 13.5 SECONDS (ref 11.8–14.2)
RBC # BLD AUTO: 5.05 MILLION/UL (ref 3.81–5.12)
SODIUM SERPL-SCNC: 138 MMOL/L (ref 136–145)
WBC # BLD AUTO: 6.93 THOUSAND/UL (ref 4.31–10.16)

## 2019-01-04 PROCEDURE — 36415 COLL VENOUS BLD VENIPUNCTURE: CPT | Performed by: EMERGENCY MEDICINE

## 2019-01-04 PROCEDURE — 87040 BLOOD CULTURE FOR BACTERIA: CPT | Performed by: EMERGENCY MEDICINE

## 2019-01-04 PROCEDURE — 96365 THER/PROPH/DIAG IV INF INIT: CPT

## 2019-01-04 PROCEDURE — 80053 COMPREHEN METABOLIC PANEL: CPT | Performed by: EMERGENCY MEDICINE

## 2019-01-04 PROCEDURE — 87631 RESP VIRUS 3-5 TARGETS: CPT | Performed by: PHYSICIAN ASSISTANT

## 2019-01-04 PROCEDURE — 83605 ASSAY OF LACTIC ACID: CPT | Performed by: EMERGENCY MEDICINE

## 2019-01-04 PROCEDURE — 94644 CONT INHLJ TX 1ST HOUR: CPT

## 2019-01-04 PROCEDURE — 94760 N-INVAS EAR/PLS OXIMETRY 1: CPT

## 2019-01-04 PROCEDURE — 94664 DEMO&/EVAL PT USE INHALER: CPT

## 2019-01-04 PROCEDURE — 96375 TX/PRO/DX INJ NEW DRUG ADDON: CPT

## 2019-01-04 PROCEDURE — 83605 ASSAY OF LACTIC ACID: CPT | Performed by: PHYSICIAN ASSISTANT

## 2019-01-04 PROCEDURE — 84145 PROCALCITONIN (PCT): CPT | Performed by: PHYSICIAN ASSISTANT

## 2019-01-04 PROCEDURE — 99291 CRITICAL CARE FIRST HOUR: CPT

## 2019-01-04 PROCEDURE — 85025 COMPLETE CBC W/AUTO DIFF WBC: CPT | Performed by: EMERGENCY MEDICINE

## 2019-01-04 PROCEDURE — 85049 AUTOMATED PLATELET COUNT: CPT | Performed by: PHYSICIAN ASSISTANT

## 2019-01-04 PROCEDURE — 94640 AIRWAY INHALATION TREATMENT: CPT

## 2019-01-04 PROCEDURE — 85610 PROTHROMBIN TIME: CPT | Performed by: EMERGENCY MEDICINE

## 2019-01-04 PROCEDURE — 71046 X-RAY EXAM CHEST 2 VIEWS: CPT

## 2019-01-04 PROCEDURE — 99223 1ST HOSP IP/OBS HIGH 75: CPT | Performed by: PHYSICIAN ASSISTANT

## 2019-01-04 PROCEDURE — 85730 THROMBOPLASTIN TIME PARTIAL: CPT | Performed by: EMERGENCY MEDICINE

## 2019-01-04 RX ORDER — BENZONATATE 100 MG/1
100 CAPSULE ORAL 3 TIMES DAILY PRN
Status: DISCONTINUED | OUTPATIENT
Start: 2019-01-04 | End: 2019-01-10 | Stop reason: HOSPADM

## 2019-01-04 RX ORDER — ONDANSETRON 2 MG/ML
4 INJECTION INTRAMUSCULAR; INTRAVENOUS EVERY 6 HOURS PRN
Status: DISCONTINUED | OUTPATIENT
Start: 2019-01-04 | End: 2019-01-10 | Stop reason: HOSPADM

## 2019-01-04 RX ORDER — SODIUM CHLORIDE FOR INHALATION 0.9 %
3 VIAL, NEBULIZER (ML) INHALATION ONCE
Status: COMPLETED | OUTPATIENT
Start: 2019-01-04 | End: 2019-01-04

## 2019-01-04 RX ORDER — IPRATROPIUM BROMIDE AND ALBUTEROL SULFATE 2.5; .5 MG/3ML; MG/3ML
3 SOLUTION RESPIRATORY (INHALATION)
Status: DISCONTINUED | OUTPATIENT
Start: 2019-01-04 | End: 2019-01-05

## 2019-01-04 RX ORDER — SODIUM CHLORIDE FOR INHALATION 0.9 %
9 VIAL, NEBULIZER (ML) INHALATION ONCE
Status: COMPLETED | OUTPATIENT
Start: 2019-01-04 | End: 2019-01-04

## 2019-01-04 RX ORDER — PREDNISONE 20 MG/1
40 TABLET ORAL DAILY
Status: DISCONTINUED | OUTPATIENT
Start: 2019-01-05 | End: 2019-01-05

## 2019-01-04 RX ORDER — SODIUM CHLORIDE 9 MG/ML
75 INJECTION, SOLUTION INTRAVENOUS CONTINUOUS
Status: DISCONTINUED | OUTPATIENT
Start: 2019-01-04 | End: 2019-01-07

## 2019-01-04 RX ORDER — ACETAMINOPHEN 325 MG/1
650 TABLET ORAL EVERY 6 HOURS PRN
Status: DISCONTINUED | OUTPATIENT
Start: 2019-01-04 | End: 2019-01-10 | Stop reason: HOSPADM

## 2019-01-04 RX ORDER — GUAIFENESIN 600 MG
600 TABLET, EXTENDED RELEASE 12 HR ORAL EVERY 12 HOURS SCHEDULED
Status: DISCONTINUED | OUTPATIENT
Start: 2019-01-04 | End: 2019-01-10 | Stop reason: HOSPADM

## 2019-01-04 RX ORDER — METHYLPREDNISOLONE SODIUM SUCCINATE 40 MG/ML
40 INJECTION, POWDER, LYOPHILIZED, FOR SOLUTION INTRAMUSCULAR; INTRAVENOUS EVERY 8 HOURS SCHEDULED
Status: DISCONTINUED | OUTPATIENT
Start: 2019-01-04 | End: 2019-01-04

## 2019-01-04 RX ORDER — SODIUM CHLORIDE FOR INHALATION 0.9 %
VIAL, NEBULIZER (ML) INHALATION
Status: COMPLETED
Start: 2019-01-04 | End: 2019-01-04

## 2019-01-04 RX ADMIN — GUAIFENESIN 600 MG: 600 TABLET, EXTENDED RELEASE ORAL at 21:48

## 2019-01-04 RX ADMIN — SODIUM CHLORIDE 100 ML/HR: 0.9 INJECTION, SOLUTION INTRAVENOUS at 21:25

## 2019-01-04 RX ADMIN — AZITHROMYCIN MONOHYDRATE 500 MG: 500 INJECTION, POWDER, LYOPHILIZED, FOR SOLUTION INTRAVENOUS at 20:00

## 2019-01-04 RX ADMIN — ALBUTEROL SULFATE 10 MG: 2.5 SOLUTION RESPIRATORY (INHALATION) at 18:31

## 2019-01-04 RX ADMIN — IPRATROPIUM BROMIDE AND ALBUTEROL SULFATE 3 ML: .5; 3 SOLUTION RESPIRATORY (INHALATION) at 22:07

## 2019-01-04 RX ADMIN — Medication 9 ML: at 18:32

## 2019-01-04 RX ADMIN — ISODIUM CHLORIDE 9 ML: 0.03 SOLUTION RESPIRATORY (INHALATION) at 18:32

## 2019-01-04 RX ADMIN — ISODIUM CHLORIDE 3 ML: 0.03 SOLUTION RESPIRATORY (INHALATION) at 18:32

## 2019-01-04 RX ADMIN — CEFTRIAXONE SODIUM 1000 MG: 2 INJECTION, POWDER, FOR SOLUTION INTRAMUSCULAR; INTRAVENOUS at 19:41

## 2019-01-04 RX ADMIN — IPRATROPIUM BROMIDE 1 MG: 0.5 SOLUTION RESPIRATORY (INHALATION) at 18:31

## 2019-01-05 PROBLEM — J44.1 COPD WITH ACUTE EXACERBATION (HCC): Status: ACTIVE | Noted: 2019-01-05

## 2019-01-05 PROBLEM — J43.9 COPD WITH EMPHYSEMA (HCC): Status: ACTIVE | Noted: 2019-01-05

## 2019-01-05 PROBLEM — J12.1 RSV (RESPIRATORY SYNCYTIAL VIRUS PNEUMONIA): Status: ACTIVE | Noted: 2019-01-04

## 2019-01-05 LAB
ANION GAP SERPL CALCULATED.3IONS-SCNC: 9 MMOL/L (ref 4–13)
BASOPHILS # BLD MANUAL: 0 THOUSAND/UL (ref 0–0.1)
BASOPHILS NFR MAR MANUAL: 0 % (ref 0–1)
BUN SERPL-MCNC: 12 MG/DL (ref 5–25)
CALCIUM SERPL-MCNC: 8.4 MG/DL (ref 8.3–10.1)
CHLORIDE SERPL-SCNC: 103 MMOL/L (ref 100–108)
CO2 SERPL-SCNC: 26 MMOL/L (ref 21–32)
CREAT SERPL-MCNC: 0.82 MG/DL (ref 0.6–1.3)
EOSINOPHIL # BLD MANUAL: 0 THOUSAND/UL (ref 0–0.4)
EOSINOPHIL NFR BLD MANUAL: 0 % (ref 0–6)
ERYTHROCYTE [DISTWIDTH] IN BLOOD BY AUTOMATED COUNT: 12.9 % (ref 11.6–15.1)
FLUAV AG SPEC QL: ABNORMAL
FLUBV AG SPEC QL: ABNORMAL
GFR SERPL CREATININE-BSD FRML MDRD: 66 ML/MIN/1.73SQ M
GLUCOSE SERPL-MCNC: 130 MG/DL (ref 65–140)
HCT VFR BLD AUTO: 43.3 % (ref 34.8–46.1)
HGB BLD-MCNC: 14.5 G/DL (ref 11.5–15.4)
LACTATE SERPL-SCNC: 1.5 MMOL/L (ref 0.5–2)
LACTATE SERPL-SCNC: 2.7 MMOL/L (ref 0.5–2)
LACTATE SERPL-SCNC: 3.2 MMOL/L (ref 0.5–2)
LYMPHOCYTES # BLD AUTO: 0.23 THOUSAND/UL (ref 0.6–4.47)
LYMPHOCYTES # BLD AUTO: 4 % (ref 14–44)
MACROCYTES BLD QL AUTO: PRESENT
MCH RBC QN AUTO: 33.6 PG (ref 26.8–34.3)
MCHC RBC AUTO-ENTMCNC: 33.5 G/DL (ref 31.4–37.4)
MCV RBC AUTO: 100 FL (ref 82–98)
MONOCYTES # BLD AUTO: 0.23 THOUSAND/UL (ref 0–1.22)
MONOCYTES NFR BLD: 4 % (ref 4–12)
MYELOCYTES NFR BLD MANUAL: 1 % (ref 0–1)
NEUTROPHILS # BLD MANUAL: 5.21 THOUSAND/UL (ref 1.85–7.62)
NEUTS BAND NFR BLD MANUAL: 3 % (ref 0–8)
NEUTS SEG NFR BLD AUTO: 87 % (ref 43–75)
NRBC BLD AUTO-RTO: 0 /100 WBCS
PLATELET # BLD AUTO: 162 THOUSANDS/UL (ref 149–390)
PLATELET BLD QL SMEAR: ADEQUATE
PMV BLD AUTO: 8.7 FL (ref 8.9–12.7)
POTASSIUM SERPL-SCNC: 3.5 MMOL/L (ref 3.5–5.3)
PROCALCITONIN SERPL-MCNC: <0.05 NG/ML
RBC # BLD AUTO: 4.32 MILLION/UL (ref 3.81–5.12)
RSV B RNA SPEC QL NAA+PROBE: DETECTED
SODIUM SERPL-SCNC: 138 MMOL/L (ref 136–145)
TOTAL CELLS COUNTED SPEC: 100
VARIANT LYMPHS # BLD AUTO: 1 %
WBC # BLD AUTO: 5.79 THOUSAND/UL (ref 4.31–10.16)

## 2019-01-05 PROCEDURE — 99232 SBSQ HOSP IP/OBS MODERATE 35: CPT | Performed by: HOSPITALIST

## 2019-01-05 PROCEDURE — 85027 COMPLETE CBC AUTOMATED: CPT | Performed by: PHYSICIAN ASSISTANT

## 2019-01-05 PROCEDURE — 99222 1ST HOSP IP/OBS MODERATE 55: CPT | Performed by: INTERNAL MEDICINE

## 2019-01-05 PROCEDURE — 80048 BASIC METABOLIC PNL TOTAL CA: CPT | Performed by: PHYSICIAN ASSISTANT

## 2019-01-05 PROCEDURE — 94640 AIRWAY INHALATION TREATMENT: CPT

## 2019-01-05 PROCEDURE — 85007 BL SMEAR W/DIFF WBC COUNT: CPT | Performed by: PHYSICIAN ASSISTANT

## 2019-01-05 PROCEDURE — 83605 ASSAY OF LACTIC ACID: CPT | Performed by: PHYSICIAN ASSISTANT

## 2019-01-05 PROCEDURE — 94760 N-INVAS EAR/PLS OXIMETRY 1: CPT

## 2019-01-05 RX ORDER — LEVALBUTEROL 1.25 MG/.5ML
1.25 SOLUTION, CONCENTRATE RESPIRATORY (INHALATION)
Status: DISCONTINUED | OUTPATIENT
Start: 2019-01-05 | End: 2019-01-06

## 2019-01-05 RX ORDER — AZITHROMYCIN 250 MG/1
250 TABLET, FILM COATED ORAL EVERY 24 HOURS
Status: DISCONTINUED | OUTPATIENT
Start: 2019-01-05 | End: 2019-01-06

## 2019-01-05 RX ORDER — METHYLPREDNISOLONE SODIUM SUCCINATE 40 MG/ML
40 INJECTION, POWDER, LYOPHILIZED, FOR SOLUTION INTRAMUSCULAR; INTRAVENOUS EVERY 8 HOURS SCHEDULED
Status: DISCONTINUED | OUTPATIENT
Start: 2019-01-05 | End: 2019-01-07

## 2019-01-05 RX ADMIN — METHYLPREDNISOLONE SODIUM SUCCINATE 40 MG: 40 INJECTION, POWDER, FOR SOLUTION INTRAMUSCULAR; INTRAVENOUS at 01:43

## 2019-01-05 RX ADMIN — LEVALBUTEROL HYDROCHLORIDE 1.25 MG: 1.25 SOLUTION, CONCENTRATE RESPIRATORY (INHALATION) at 07:15

## 2019-01-05 RX ADMIN — METHYLPREDNISOLONE SODIUM SUCCINATE 40 MG: 40 INJECTION, POWDER, FOR SOLUTION INTRAMUSCULAR; INTRAVENOUS at 21:29

## 2019-01-05 RX ADMIN — IPRATROPIUM BROMIDE AND ALBUTEROL SULFATE 3 ML: .5; 3 SOLUTION RESPIRATORY (INHALATION) at 01:17

## 2019-01-05 RX ADMIN — ENOXAPARIN SODIUM 40 MG: 40 INJECTION SUBCUTANEOUS at 10:35

## 2019-01-05 RX ADMIN — IPRATROPIUM BROMIDE 0.5 MG: 0.5 SOLUTION RESPIRATORY (INHALATION) at 19:48

## 2019-01-05 RX ADMIN — SODIUM CHLORIDE 125 ML/HR: 0.9 INJECTION, SOLUTION INTRAVENOUS at 10:34

## 2019-01-05 RX ADMIN — LEVALBUTEROL HYDROCHLORIDE 1.25 MG: 1.25 SOLUTION, CONCENTRATE RESPIRATORY (INHALATION) at 13:10

## 2019-01-05 RX ADMIN — AZITHROMYCIN 250 MG: 250 TABLET, FILM COATED ORAL at 16:56

## 2019-01-05 RX ADMIN — GUAIFENESIN 600 MG: 600 TABLET, EXTENDED RELEASE ORAL at 21:29

## 2019-01-05 RX ADMIN — METHYLPREDNISOLONE SODIUM SUCCINATE 40 MG: 40 INJECTION, POWDER, FOR SOLUTION INTRAMUSCULAR; INTRAVENOUS at 13:55

## 2019-01-05 RX ADMIN — IPRATROPIUM BROMIDE 0.5 MG: 0.5 SOLUTION RESPIRATORY (INHALATION) at 13:10

## 2019-01-05 RX ADMIN — GUAIFENESIN 600 MG: 600 TABLET, EXTENDED RELEASE ORAL at 10:35

## 2019-01-05 RX ADMIN — LEVALBUTEROL HYDROCHLORIDE 1.25 MG: 1.25 SOLUTION, CONCENTRATE RESPIRATORY (INHALATION) at 19:48

## 2019-01-05 RX ADMIN — Medication 1 SPRAY: at 05:45

## 2019-01-05 RX ADMIN — IPRATROPIUM BROMIDE 0.5 MG: 0.5 SOLUTION RESPIRATORY (INHALATION) at 07:15

## 2019-01-05 RX ADMIN — METHYLPREDNISOLONE SODIUM SUCCINATE 40 MG: 40 INJECTION, POWDER, FOR SOLUTION INTRAMUSCULAR; INTRAVENOUS at 04:52

## 2019-01-05 NOTE — CONSULTS
Pulmonary Consultation   Martha Horta 80 y o  female MRN: [id number]  Unit/Bed#: -01 Encounter: [id number]      Reason for consultation: COPD/pneumonia    Requesting physician: Dr Matamoros     Impressions/Plans:    1  Acute hypoxic respiratory failure secondary to right lower lobe RSV pneumonia  Fortunately she has improved over the past 24 hours and is requiring less supplemental oxygen  · Continue to wean supplemental oxygen for sats greater than 88%  · Discontinue Zithromax  · Continue Solu-Medrol q 8 hours as she continues to have significant wheeze/bronchospasm  · Continue Xopenex/Atrovent q 6 hours    2  Probable COPD of unknown severity with acute exacerbation  · Would benefit from outpatient pulmonary function tests and pulmonary follow-up as she may need a chronic daily inhaler      History of Present Illness   HPI:  Martha Horta is a 80 y o  female who presents to the hospital with 2-3 days of feeling poorly  She states she started with worsening shortness of breath on exertion to the point where she cannot walk a couple feet without stopping  She was having cough productive of foamy white phlegm  She states that the cough was unrelenting  She had a low-grade fever but denies any chills  She had chest tightness and wheezing  At baseline she has been told in the past she may have COPD but she does not follow with a pulmonologist and has not had pulmonary function testing  Fortunately she quit smoking 18 years ago  She does not use supplemental oxygen or inhalers at home  She has chronic shortness of breath on exertion that has worsened over the past couple of months  She has no significant chronic cough, wheeze or chest pain  She previously has a 50 pack-year smoking history  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  She moved from [facility] to [place] a year and a half ago to be closer to family after her  with dementia passed away      Review of systems:  12 point review of systems was completed and was otherwise negative except as listed in HPI  Historical Information   Past Medical History:   Diagnosis Date    Colon cancer [facility])     Status post resection 2017    Disease of thyroid gland     not on meds    Emphysema/COPD [facility])     former smoker    [place] (hard of hearing)     uses hearing aids    Pneumonia 2007    hospitalized     Past Surgical History:   Procedure Laterality Date    CHOLECYSTECTOMY      COLONOSCOPY      GA LAP,SURG,COLECTOMY, PARTIAL, W/ANAST Right 8/18/2017    Procedure: LAPAROSCOPIC COLECTOMY;  Surgeon: Zainab Huertas MD;  Location: [facility];  Service: Colorectal    TONSILLECTOMY       Family History   Problem Relation Age of Onset    Ovarian cancer Mother     COPD Father        Occupational History:  Retired nurse    Social History:  History   Smoking Status    Former Smoker    Packs/day: 1 00    Years: 50 00    Types: Cigarettes    Quit date: 2000   Smokeless Tobacco    Never Used     History   Alcohol Use    Yes     Comment: occ     History   Drug Use No     Marital Status:        Meds/Allergies   Current Facility-Administered Medications   Medication Dose Route Frequency    acetaminophen (TYLENOL) tablet 650 mg  650 mg Oral Q6H PRN    azithromycin (ZITHROMAX) tablet 250 mg  250 mg Oral Q24H    benzonatate (TESSALON PERLES) capsule 100 mg  100 mg Oral TID PRN    enoxaparin (LOVENOX) subcutaneous injection 40 mg  40 mg Subcutaneous Daily    guaiFENesin (MUCINEX) 12 hr tablet 600 mg  600 mg Oral Q12H CÉSAR    ipratropium (ATROVENT) 0 02 % inhalation solution 0 5 mg  0 5 mg Nebulization Q6H    levalbuterol (XOPENEX) inhalation solution 1 25 mg  1 25 mg Nebulization Q6H    methylPREDNISolone sodium succinate (Solu-MEDROL) injection 40 mg  40 mg Intravenous Q8H [address]    ondansetron (ZOFRAN) injection 4 mg  4 mg Intravenous Q6H PRN    phenol (CHLORASEPTIC) 1 4 % mucosal liquid 1 spray  1 spray Mouth/Throat Q2H PRN    sodium chloride 0 9 % infusion  75 mL/hr Intravenous Continuous     No prescriptions prior to admission  Allergies   Allergen Reactions    Epinephrine Other (See Comments)     Mental change, anxious       Vitals: Blood pressure 134/81, pulse 94, temperature 98 7 °F (37 1 °C), temperature source Oral, resp  rate 18, height 5' 3" (1 6 m), weight 56 8 kg (125 lb 3 5 oz), SpO2 93 %  , 6 L nasal cannula, Body mass index is 22 18 kg/m²        Intake/Output Summary (Last 24 hours) at 01/05/19 1423  Last data filed at 01/05/19 1224   Gross per 24 hour   Intake             1510 ml   Output                0 ml   Net             1510 ml       Physical exam:    General Appearance:    Alert, cooperative, no conversational dyspnea or accessory     muscle use       Head/eyes:    Normocephalic, without obvious abnormality, atraumatic,         PERRL, sclera non-injected, nonicteric    Nose:   Nares normal, mucosa normal, no drainage or sinus tenderness   Mouth:   Moist mucous membranes, no thrush, normal dentition   Neck:   Supple, trachea midline, no adenopathy; JVD not elevated   Lungs:     Decreased breath sounds in all lung fields with diffuse expiratory wheeze bilateral, no rales or rhonchi appreciated   Chest Wall:    No tenderness or deformity    Heart:    Regular rate and rhythm, S1 and S2 normal, no murmur, rub   or gallop   Abdomen:     Soft, non-tender, bowel sounds active all four quadrants,     no masses, no organomegaly   Extremities:   Extremities normal, atraumatic, no cyanosis or edema   Skin:   Warm, dry, turgor normal, no rashes or lesions   Lymph nodes:   No Cervical or supraclavicular lymphadenopathy   Neurologic:   CNII-XII grossly intact, normal strength, non-focal         Labs:   CBC:   Lab Results   Component Value Date    WBC 5 79 01/05/2019    HGB 14 5 01/05/2019    HCT 43 3 01/05/2019     (H) 01/05/2019     01/05/2019    MCH 33 6 01/05/2019    MCHC 33 5 01/05/2019 RDW 12 9 01/05/2019    MPV 8 7 (L) 01/05/2019    NRBC 0 01/05/2019   , CMP:   Lab Results   Component Value Date    SODIUM 138 01/05/2019    K 3 5 01/05/2019     01/05/2019    CO2 26 01/05/2019    BUN 12 01/05/2019    CREATININE 0 82 01/05/2019    CALCIUM 8 4 01/05/2019    AST 39 01/04/2019    ALT 18 01/04/2019    ALKPHOS 73 01/04/2019    EGFR 66 01/05/2019   , RSV-positive    Imaging and other studies: I have personally reviewed pertinent films in PACS  Chest x-ray 01/04/2019:  Right lower lobe infiltrate, upper lobe emphysema    Pulmonary function testing:  None available      Brielle Dopp, DO      Portions of the record may have been created with voice recognition software  Occasional wrong word or "sound a like" substitutions may have occurred due to the inherent limitations of voice recognition software  Read the chart carefully and recognize, using context, where substitutions have occurred

## 2019-01-05 NOTE — ASSESSMENT & PLAN NOTE
· Diagnosed in August 2017 with obstruction mass in the mid ascending colon status post right hemicolectomy on 08/2017 showed adenocarcinoma the right colon, moderately differentiated with extension into the muscularis propria, stage IIIB ( T3 N1 M0) with 1/17 positive lymph nodes  · Not currently pursuing treatment  · Follows with Dr Drea Latham

## 2019-01-05 NOTE — ASSESSMENT & PLAN NOTE
· Patient presents with 5 days worth of increasing dyspnea, cough which is productive of white sputum  Afebrile, good p o  Intake  No reported sick contacts    · Chest Xray negative for any infiltrate as per my read, await official read  · Flu pending  · PCT <0 05  · Will continue azithro; dc ceftriaxone   · BMP and CBC

## 2019-01-05 NOTE — ASSESSMENT & PLAN NOTE
· POA, as evidenced by tachypnea, tachycardia, suspected source URI, lactic acidosis and hypoxia present  No leukocytosis    · Initial lactic acid 2 5, will give IVF and trend to normal  · Treatment as per primary problem above

## 2019-01-05 NOTE — ASSESSMENT & PLAN NOTE
· Patient presents with 5 days worth of increasing dyspnea, cough which is productive of white sputum  Afebrile, good p o  Intake  No reported sick contacts    · Chest Xray negative for any infiltrate as per my read, await official read  · Check procalcitonin, strep and legionella antigens, sputum culture Gram stain, BC  · Rule out flu/rsv  · Respiratory protocol, nebulized treatments, antitussives, mucolytic, antipyretics, antiemetic, supplemental oxygen as needed to maintain saturations above 92%  · Continue ceftriaxone and azithromycin at this time  · Monitor BMP, CBC

## 2019-01-05 NOTE — ASSESSMENT & PLAN NOTE
· Patient reports she "might have a touch of COPD, I smoked for 50 years and occasionally get out of breath with exertion"  She has been told in the past that she might have underlying COPD with emphysema, but has never been formally diagnosed and has never been fully worked up  · Respiratory protocol, nebulized treatments, treatment of underlying infection  · Supplemental oxygen via nasal cannula to maintain saturations above 92%  · Obtain echo as patient reports intermittent lower leg edema, with murmur appreciated  Gentle IVF and monitor volume status  · May be a component of COPD exacerbation, will consult pulmonology at this time   Start prednisone taper

## 2019-01-05 NOTE — ASSESSMENT & PLAN NOTE
· Diagnosed in August 2017 with obstruction mass in the mid ascending colon status post right hemicolectomy on 08/2017 showed adenocarcinoma the right colon, moderately differentiated with extension into the muscularis propria, stage IIIB ( T3 N1 M0) with 1/17 positive lymph nodes  · Not currently pursuing treatment  · Follows with Dr Anson Cheung

## 2019-01-05 NOTE — UTILIZATION REVIEW
Initial Clinical Review    [facility] Review Department  Phone: [phone]; Fax [phone]  [email or web address]  ATTENTION: Please call with any questions or concerns to [phone]  and carefully listen to the prompts so that you are directed to the right person  Send all requests for admission clinical reviews, approved or denied determinations and any other requests to fax [phone]  All voicemails are confidential     Admission: Date/Time/Statement: 1/4/19 @ 2010     Orders Placed This Encounter   Procedures    Inpatient Admission (expected length of stay for this patient is greater than two midnights)     Standing Status:   Standing     Number of Occurrences:   1     Order Specific Question:   Admitting Physician     Answer:   Josephine Bianchi [[id number]]     Order Specific Question:   Level of Care     Answer:   Med Surg [16]     Order Specific Question:   Estimated length of stay     Answer:   More than 2 Midnights     Order Specific Question:   Certification     Answer:   I certify that inpatient services are medically necessary for this patient for a duration of greater than two midnights  See H&P and MD Progress Notes for additional information about the patient's course of treatment  ED: Date/Time/Mode of Arrival:   ED Arrival Information     Expected Arrival Acuity Means of Arrival Escorted By Service Admission Type    - 1/4/2019 17:58 Emergent Wheelchair Family Member General Medicine Emergency    Arrival Complaint    breathing problems/cough          Chief Complaint:   Chief Complaint   Patient presents with    Shortness of Breath     pt reports prod cough this week  states now SOB  denies any fevers  History of Illness:    Kristina Borrego is a 80 y o  female who presents with five-day history of increasing dyspnea, malaise, productive cough  She denies any fevers and has had good p o  Intake    No congestion, chest pain, headaches, lightheadedness, dizziness, rhinorrhea, sore throat  No reported sick contacts  Hx of Pneumonia  She has not had her flu shot yet this year  In the past she has been told she may have some underlying COPD but has never been formally diagnosed or workup  She has a 50 pack-year smoking history, but has not smoked for the past 18 years  Presented to emergency department in respiratory distress with saturation of 69%  Currently is in no acute distress reports her breathing is much improved since treatment  ED Vital Signs:   ED Triage Vitals   Temperature Pulse Respirations Blood Pressure SpO2   01/04/19 1823 01/04/19 1809 01/04/19 1809 01/04/19 1809 01/04/19 1809   99 1 °F (37 3 °C) (!) 114 22 158/98 (!) 69 %      Temp Source Heart Rate Source Patient Position - Orthostatic VS BP Location FiO2 (%)   01/04/19 1809 01/04/19 1809 01/04/19 2034 01/04/19 2034 --   Oral Monitor Sitting Right arm       Pain Score       01/04/19 1809       No Pain        Wt Readings from Last 1 Encounters:   01/04/19 56 8 kg (125 lb 3 5 oz)       Vital Signs (abnormal):  T max 99 3 -  To NRB Mask 15L   Sat to 95% -  Weaned to 6 L on 1/5 - sat to 93%     Abnormal Labs/Diagnostic Test Results:  - 1/4 - Cl 99 - T Protein 8 6 - Rt bili 1 60 - lactic Acid 2 5 - Wbc 6 93 - H/H17 0/51 3    RSV detected   1/5 - BMP - normal = Wbc 5 79 - H/H 14 5/43 3    CXR  - Pulmonary emphysema  Suspicion of superimposed infiltrate within the right mid and lower lung field         ED Treatment:   Medication Administration from 01/04/2019 1758 to 01/04/2019 2059       Date/Time Order Dose Route Action     01/04/2019 1831 albuterol inhalation solution 10 mg 10 mg Nebulization Given     01/04/2019 1831 ipratropium (ATROVENT) 0 02 % inhalation solution 1 mg 1 mg Nebulization Given     01/04/2019 1832 sodium chloride 0 9 % inhalation solution 3 mL 3 mL Nebulization Given     01/04/2019 1832 sodium chloride 0 9 % inhalation solution 9 mL 9 mL Nebulization Given     01/04/2019 1941 ceftriaxone (ROCEPHIN) 1 g/50 mL in dextrose IVPB 1,000 mg Intravenous New Bag     01/04/2019 2000 azithromycin (ZITHROMAX) 500 mg in sodium chloride 0 9 % 250 mL IVPB 500 mg Intravenous New Bag       Past Medical/Surgical History:     Diagnosis    [facility]    Colon obstruction (HCC)    Disease of thyroid gland    [facility]    Emphysema/COPD ([facility] )    [place] (hard of hearing)    Pneumonia    Pulmonary emphysema (HCC)       Admitting Diagnosis: Cough [R05]  Upper respiratory infection [J06 9]  Hypoxia [R09 02]  Trouble breathing [R06 89]    Age/Sex: 80 y o  female    Assessment/Plan:   Acute lower respiratory infection     · Patient presents with 5 days worth of increasing dyspnea, cough which is productive of white sputum  Afebrile, good p o  Intake  No reported sick contacts  Chest Xray  - Pulmonary emphysema  Suspicion of superimposed infiltrate within the right mid and lower lung field  · Check procalcitonin, strep and legionella antigens, sputum culture Gram stain, BC  · Rule out flu/rsv  · Respiratory protocol, nebulized treatments, antitussives, mucolytic, antipyretics, antiemetic, supplemental oxygen as needed to maintain saturations above 92%  · Continue ceftriaxone and azithromycin at this time  · Monitor BMP, CBC      Severe sepsis (HCC)     · POA, as evidenced by tachypnea, tachycardia, suspected source URI, lactic acidosis and hypoxia present  No leukocytosis  · Initial lactic acid 2 5, will give IVF and trend to normal  · Treatment as per primary problem above      Acute respiratory failure with hypoxia ([facility] )     · Patient reports she "might have a touch of COPD, I smoked for 50 years and occasionally get out of breath with exertion"  She has been told in the past that she might have underlying COPD with emphysema, but has never been formally diagnosed and has never been fully worked up    · Respiratory protocol, nebulized treatments, treatment of underlying infection  · Supplemental oxygen via nasal cannula to maintain saturations above 92%    · Obtain echo as patient reports intermittent lower leg edema, with murmur appreciated  · Pulmonology follow-up as an outpatient      Colonic mass     · Diagnosed in August 2017 with obstruction mass in the mid ascending colon status post right hemicolectomy on 08/2017 showed adenocarcinoma the right colon, moderately differentiated with extension into the muscularis propria, stage IIIB ( T3 N1 M0) with 1/17 positive lymph nodes  · Not currently pursuing treatment  · Follows with Dr Jeni Cervantes            Admission Orders:  Scheduled Meds:   Current Facility-Administered Medications:  acetaminophen 650 mg Oral Q6H PRN    azithromycin 250 mg Oral Q24H    benzonatate 100 mg Oral TID PRN    enoxaparin 40 mg Subcutaneous Daily    guaiFENesin 600 mg Oral Q12H CÉSAR    ipratropium 0 5 mg Nebulization Q6H    levalbuterol 1 25 mg Nebulization Q6H    methylPREDNISolone sodium succinate 40 mg Intravenous Q8H [address]    ondansetron 4 mg Intravenous Q6H PRN    phenol 1 spray Mouth/Throat Q2H PRN 1/5 x 1      Continuous Infusions:   sodium chloride 75 mL/hr     Nursing orders - VS q4 - Up & OOB as tolerated - SCD's to le's- respiratory protocol - O2 weaning - diet regular house - PT joe

## 2019-01-05 NOTE — H&P
H&P- Fern Palacios 1935, 80 y o  female MRN: [id number]  Unit/Bed#: -01 Encounter: [id number]  Primary Care Provider: Sukumar Moss   Date and time admitted to hospital: 1/4/2019  6:06 PM    * Acute lower respiratory infection   Assessment & Plan    · Patient presents with 5 days worth of increasing dyspnea, cough which is productive of white sputum  Afebrile, good p o  Intake  No reported sick contacts  · Chest Xray negative for any infiltrate as per my read, await official read  · Check procalcitonin, strep and legionella antigens, sputum culture Gram stain, BC  · Rule out flu/rsv  · Respiratory protocol, nebulized treatments, antitussives, mucolytic, antipyretics, antiemetic, supplemental oxygen as needed to maintain saturations above 92%  · Continue ceftriaxone and azithromycin at this time  · Monitor BMP, CBC     Severe sepsis (HCC)   Assessment & Plan    · POA, as evidenced by tachypnea, tachycardia, suspected source URI, lactic acidosis and hypoxia present  No leukocytosis  · Initial lactic acid 2 5, will give IVF and trend to normal  · Treatment as per primary problem above     Acute respiratory failure with hypoxia ([facility] )   Assessment & Plan    · Patient reports she "might have a touch of COPD, I smoked for 50 years and occasionally get out of breath with exertion"  She has been told in the past that she might have underlying COPD with emphysema, but has never been formally diagnosed and has never been fully worked up  · Respiratory protocol, nebulized treatments, treatment of underlying infection  · Supplemental oxygen via nasal cannula to maintain saturations above 92%    · Obtain echo as patient reports intermittent lower leg edema, with murmur appreciated  · Pulmonology follow-up as an outpatient     Colonic mass   Assessment & Plan    · Diagnosed in August 2017 with obstruction mass in the mid ascending colon status post right hemicolectomy on 08/2017 showed adenocarcinoma the right colon, moderately differentiated with extension into the muscularis propria, stage IIIB ( T3 N1 M0) with 1/17 positive lymph nodes  · Not currently pursuing treatment  · Follows with Dr Aries Yung       VTE Prophylaxis: Enoxaparin (Lovenox)  / sequential compression device   Code Status: full  POLST: POLST form is not discussed and not completed at this time  Discussion with family:  Discussion with patient and daughter done at bedside, all questions answered  Anticipated Length of Stay:  Patient will be admitted on an Inpatient basis with an anticipated length of stay of greater than 2 midnights  Justification for Hospital Stay: Acute URI, severe sepsis, hypoxia    Total Time for Visit, including Counseling / Coordination of Care: 1 hour  Greater than 50% of this total time spent on direct patient counseling and coordination of care  Chief Complaint:   dyspnea    History of Present Illness:    Berta Loomis is a 80 y o  female who presents with five-day history of increasing dyspnea, malaise, productive cough  She denies any fevers and has had good p o  Intake  No congestion, chest pain, headaches, lightheadedness, dizziness, rhinorrhea, sore throat  No reported sick contacts  She has had pneumonia twice, and last sugar came down with a case of the flu  She has not had her flu shot yet this year  In the past she has been told she may have some underlying COPD but has never been formally diagnosed or workup  She has a 50 pack-year smoking history, but has not smoked for the past 18 years  Presented to emergency department in respiratory distress with saturation of 69%  Currently is in no acute distress reports her breathing is much improved since treatment  Review of Systems:    Review of Systems   Constitutional: Positive for chills  Negative for activity change, appetite change, fatigue and fever  HENT: Negative for congestion, rhinorrhea, sinus pressure and sore throat      Eyes: Negative for photophobia, pain and visual disturbance  Respiratory: Positive for cough, chest tightness, shortness of breath and wheezing  Cardiovascular: Negative for chest pain, palpitations and leg swelling  Gastrointestinal: Negative for abdominal distention, abdominal pain, constipation, diarrhea, nausea and vomiting  Endocrine: Negative for cold intolerance, heat intolerance, polydipsia and polyuria  Genitourinary: Negative for difficulty urinating, dysuria, flank pain, frequency and hematuria  Musculoskeletal: Negative for arthralgias, back pain and joint swelling  Skin: Negative for color change, pallor and rash  Allergic/Immunologic: Negative  Neurological: Positive for weakness  Negative for dizziness, syncope, light-headedness and headaches  Hematological: Negative  Psychiatric/Behavioral: Negative  Past Medical and Surgical History:     Past Medical History:   Diagnosis Date    Cancer [facility])     colon    Colon obstruction ([place] [address] )     Disease of thyroid gland     not on meds    Emphysema lung ([place] [address] )     Emphysema/COPD ([place] [address] )     former smoker    [place] (hard of hearing)     uses hearing aids    Pneumonia 2007    hospitalized    Pulmonary emphysema ([place] [address] )        Past Surgical History:   Procedure Laterality Date    COLONOSCOPY      WI LAP,SURG,COLECTOMY, PARTIAL, W/ANAST Right 8/18/2017    Procedure: LAPAROSCOPIC COLECTOMY;  Surgeon: Tatianna Hernandez MD;  Location: [facility];  Service: Colorectal    TONSILLECTOMY         Meds/Allergies:    Prior to Admission medications    Not on File     I have reviewed home medications with patient family member  Allergies: Allergies   Allergen Reactions    Epinephrine Other (See Comments)     Mental change, anxious       Social History:     Marital Status:     Occupation: retired nurse  Patient Pre-hospital Living Situation: home  Patient Pre-hospital Level of Mobility: full  Patient Pre-hospital Diet Restrictions: none  Substance Use History:   History   Alcohol Use    Yes     Comment: occ     History   Smoking Status    Former Smoker    Quit date: 2000   Smokeless Tobacco    Never Used     History   Drug Use No       Family History:    non-contributory    Physical Exam:     Vitals:   Blood Pressure: 140/70 (01/04/19 2034)  Pulse: 105 (01/04/19 2034)  Temperature: 99 1 °F (37 3 °C) (01/04/19 1823)  Temp Source: Oral (01/04/19 1823)  Respirations: 22 (01/04/19 2034)  SpO2: 94 % (01/04/19 2034)    Physical Exam   Constitutional: She is oriented to person, place, and time  She appears well-developed and well-nourished  No distress  HENT:   Head: Normocephalic and atraumatic  Mouth/Throat: Oropharynx is clear and moist    Eyes: Pupils are equal, round, and reactive to light  No scleral icterus  Neck: Neck supple  No JVD present  Cardiovascular: Normal rate and regular rhythm  Murmur heard  Pulmonary/Chest: Effort normal  No respiratory distress  She has wheezes (Moderate wheezing noted in all lung fields)  She has no rales  Abdominal: Soft  Bowel sounds are normal  She exhibits no distension  There is no tenderness  There is no rebound and no guarding  Musculoskeletal: She exhibits no edema  Neurological: She is alert and oriented to person, place, and time  No cranial nerve deficit  GCS eye subscore is 4  GCS verbal subscore is 5  GCS motor subscore is 6  Skin: Skin is warm and dry  No rash noted  She is not diaphoretic  No erythema  Psychiatric: She has a normal mood and affect  Her behavior is normal    Nursing note and vitals reviewed  Additional Data:     Lab Results: I have personally reviewed pertinent reports          Results from last 7 days  Lab Units 01/04/19  1827   WBC Thousand/uL 6 93   HEMOGLOBIN g/dL 17 0*   HEMATOCRIT % 51 3*   PLATELETS Thousands/uL 195   NEUTROS PCT % 78*   LYMPHS PCT % 11*   MONOS PCT % 9   EOS PCT % 0       Results from last 7 days  Lab Units 01/04/19  1827   SODIUM mmol/L 138 POTASSIUM mmol/L 3 7   CHLORIDE mmol/L 99*   CO2 mmol/L 28   BUN mg/dL 13   CREATININE mg/dL 1 02   ANION GAP mmol/L 11   CALCIUM mg/dL 9 2   ALBUMIN g/dL 3 8   TOTAL BILIRUBIN mg/dL 1 60*   ALK PHOS U/L 73   ALT U/L 18   AST U/L 39   GLUCOSE RANDOM mg/dL 122       Results from last 7 days  Lab Units 01/04/19  1827   INR  1 06               Results from last 7 days  Lab Units 01/04/19  1827   LACTIC ACID mmol/L 2 5*       Imaging: I have personally reviewed pertinent films in PACS    XR chest 2 views   ED Interpretation by Haroon Lin MD (01/04 1958)   This film was interpreted independently by me  Possible RUL infiltrate, calcified aortic knob          EKG, Pathology, and Other Studies Reviewed on Admission:   · none    Allscripts / Epic Records Reviewed: Yes     ** Please Note: This note has been constructed using a voice recognition system   **

## 2019-01-05 NOTE — ED PROVIDER NOTES
History  Chief Complaint   Patient presents with    Shortness of Breath     pt reports prod cough this week  states now SOB  denies any fevers  77-year-old female presents chief complaint of shortness of breath with upper respiratory infection type symptoms  Patient has been sick for about 1 week and has progressively worsened  Today patient came in with a room air sat in the 60s  Patient was started on a non-rebreather and her oxygenation improved into the 90s  Patient reports that she may have a touch of emphysema  Patient reports 2 times prior she had similar symptoms related to pneumonia and had to be admitted  No fevers or chills however patient's temperature is mildly elevated  History provided by:  Patient   used: No    Shortness of Breath   Severity:  Moderate  Onset quality:  Gradual  Duration:  1 week  Timing:  Constant  Progression:  Worsening  Chronicity:  New  Context: URI    Relieved by:  Oxygen  Worsened by:  Nothing  Ineffective treatments:  None tried  Associated symptoms: cough and wheezing    Associated symptoms: no chest pain, no diaphoresis, no fever, no rash, no sputum production and no vomiting        None       Past Medical History:   Diagnosis Date    Cancer [facility])     colon    [facility] obstruction ([facility] [address] [facility]     Disease of thyroid gland     not on meds    Emphysema lung ([facility] [address] [facility]     Emphysema/COPD ([facility] [address] )     former smoker    [place]hard of hearing[place]     uses hearing aids    Pneumonia 2007    hospitalized    Pulmonary emphysema ([facility] [address] )        Past Surgical History:   Procedure Laterality Date    COLONOSCOPY      NM LAP,SURG,COLECTOMY, PARTIAL, W/ANAST Right 8/18/2017    Procedure: LAPAROSCOPIC COLECTOMY;  Surgeon: Larry Mora MD;  Location: [facility];  Service: Colorectal    TONSILLECTOMY         History reviewed  No pertinent family history  I have reviewed and agree with the history as documented      Social History   Substance Use Topics    Smoking status: Former Smoker     Quit date: 2000    Smokeless tobacco: Never Used    Alcohol use Yes      Comment: occ        Review of Systems   Constitutional: Negative for chills, diaphoresis and fever  Respiratory: Positive for cough, shortness of breath and wheezing  Negative for sputum production  Cardiovascular: Negative for chest pain and palpitations  Gastrointestinal: Negative for diarrhea, nausea and vomiting  Genitourinary: Negative for dysuria and frequency  Skin: Negative for rash  All other systems reviewed and are negative  Physical Exam  Physical Exam   Constitutional: She is oriented to person, place, and time  She appears well-developed and well-nourished  She appears distressed  HENT:   Head: Normocephalic and atraumatic  Eyes: Pupils are equal, round, and reactive to light  EOM are normal    Neck: Normal range of motion  No JVD present  Cardiovascular: Regular rhythm, normal heart sounds and intact distal pulses  Tachycardia present  Exam reveals no gallop and no friction rub  No murmur heard  Pulmonary/Chest: She is in respiratory distress  She has wheezes  She has no rales  She exhibits no tenderness  Musculoskeletal: Normal range of motion  She exhibits no tenderness  Neurological: She is alert and oriented to person, place, and time  Skin: Skin is warm and dry  Psychiatric: She has a normal mood and affect  Her behavior is normal  Judgment and thought content normal    Nursing note and vitals reviewed        Vital Signs  ED Triage Vitals   Temperature Pulse Respirations Blood Pressure SpO2   01/04/19 1823 01/04/19 1809 01/04/19 1809 01/04/19 1809 01/04/19 1809   99 1 °F (37 3 °C) (!) 114 22 158/98 (!) 69 %      Temp Source Heart Rate Source Patient Position - Orthostatic VS BP Location FiO2 (%)   01/04/19 1809 01/04/19 1809 01/04/19 2034 01/04/19 2034 --   Oral Monitor Sitting Right arm       Pain Score       01/04/19 1809       No Pain Vitals:    01/04/19 1809 01/04/19 1815 01/04/19 1853 01/04/19 2034   BP: 158/98 158/98 145/76 140/70   Pulse: (!) 114 (!) 108 98 105   Patient Position - Orthostatic VS:    Sitting       Visual Acuity      ED Medications  Medications   azithromycin (ZITHROMAX) 500 mg in sodium chloride 0 9 % 250 mL IVPB (500 mg Intravenous New Bag 1/4/19 2000)   albuterol inhalation solution 10 mg (10 mg Nebulization Given 1/4/19 1831)     And   ipratropium (ATROVENT) 0 02 % inhalation solution 1 mg (1 mg Nebulization Given 1/4/19 1831)     And   sodium chloride 0 9 % inhalation solution 3 mL (3 mL Nebulization Given 1/4/19 1832)   sodium chloride 0 9 % inhalation solution 9 mL (9 mL Nebulization Given 1/4/19 1832)   ceftriaxone (ROCEPHIN) 1 g/50 mL in dextrose IVPB (0 mg Intravenous Stopped 1/4/19 2018)       Diagnostic Studies  Results Reviewed     Procedure Component Value Units Date/Time    Lactic acid, plasma [[id number]]  (Abnormal) Collected:  01/04/19 1827    Lab Status:  Final result Specimen:  Blood from Arm, Left Updated:  01/04/19 1859     LACTIC ACID 2 5 (HH) mmol/L     Narrative:         Result may be elevated if tourniquet was used during collection  Comprehensive metabolic panel [[id number]]  (Abnormal) Collected:  01/04/19 1827    Lab Status:  Final result Specimen:  Blood from Arm, Left Updated:  01/04/19 1855     Sodium 138 mmol/L      Potassium 3 7 mmol/L      Chloride 99 (L) mmol/L      CO2 28 mmol/L      ANION GAP 11 mmol/L      BUN 13 mg/dL      Creatinine 1 02 mg/dL      Glucose 122 mg/dL      Calcium 9 2 mg/dL      AST 39 U/L      ALT 18 U/L      Alkaline Phosphatase 73 U/L      Total Protein 8 6 (H) g/dL      Albumin 3 8 g/dL      Total Bilirubin 1 60 (H) mg/dL      eGFR 51 ml/min/1 73sq m     Narrative:         National Kidney Disease Education Program recommendations are as follows:  GFR calculation is accurate only with a steady state creatinine  Chronic Kidney disease less than 60 ml/min/1 73 sq  meters  Kidney failure less than 15 ml/min/1 73 sq  meters  Blood culture #1 [[id number]] Collected:  01/04/19 1830    Lab Status: In process Specimen:  Blood from Arm, Right Updated:  01/04/19 1848    Protime-INR [[id number]]  (Normal) Collected:  01/04/19 1827    Lab Status:  Final result Specimen:  Blood from Arm, Left Updated:  01/04/19 1847     Protime 13 5 seconds      INR 1 06    APTT [[id number]]  (Normal) Collected:  01/04/19 1827    Lab Status:  Final result Specimen:  Blood from Arm, Left Updated:  01/04/19 1847     PTT 38 seconds     CBC and differential [[id number]]  (Abnormal) Collected:  01/04/19 1827    Lab Status:  Final result Specimen:  Blood from Arm, Left Updated:  01/04/19 1835     WBC 6 93 Thousand/uL      RBC 5 05 Million/uL      Hemoglobin 17 0 (H) g/dL      Hematocrit 51 3 (H) %       (H) fL      MCH 33 7 pg      MCHC 33 1 g/dL      RDW 13 0 %      MPV 8 5 (L) fL      Platelets 458 Thousands/uL      nRBC 0 /100 WBCs      Neutrophils Relative 78 (H) %      Immat GRANS % 1 %      Lymphocytes Relative 11 (L) %      Monocytes Relative 9 %      Eosinophils Relative 0 %      Basophils Relative 1 %      Neutrophils Absolute 5 40 Thousands/µL      Immature Grans Absolute 0 04 Thousand/uL      Lymphocytes Absolute 0 78 Thousands/µL      Monocytes Absolute 0 65 Thousand/µL      Eosinophils Absolute 0 01 Thousand/µL      Basophils Absolute 0 05 Thousands/µL     Blood culture #2 [[id number]] Collected:  01/04/19 1827    Lab Status: In process Specimen:  Blood from Arm, Left Updated:  01/04/19 1834                 XR chest 2 views   ED Interpretation by Brent Gloria MD (01/04 1958)   This film was interpreted independently by me     Possible RUL infiltrate, calcified aortic knob                 Procedures  CriticalCare Time  Performed by: Renee Masters by: Elspeth Gosselin     Critical care provider statement:     Critical care time (minutes):  40    Critical care time was exclusive of:  Separately billable procedures and treating other patients and teaching time    Critical care was necessary to treat or prevent imminent or life-threatening deterioration of the following conditions:  Respiratory failure    Critical care was time spent personally by me on the following activities:  Obtaining history from patient or surrogate, development of treatment plan with patient or surrogate, blood draw for specimens, evaluation of patient's response to treatment, examination of patient, interpretation of cardiac output measurements, ordering and performing treatments and interventions, ordering and review of laboratory studies, ordering and review of radiographic studies and re-evaluation of patient's condition           Phone Contacts  ED Phone Contact    ED Course                               MDM  Number of Diagnoses or Management Options  Diagnosis management comments: 80 y o  female presents with uri complaints and shortness of breath    Differential: viral uri, pneumonia, bronchitis, pleurisy, post nasal drip, influenza  Plan: Sepsis workup, admission symptomatic treatment            Amount and/or Complexity of Data Reviewed  Clinical lab tests: ordered  Tests in the radiology section of CPT®: ordered    Risk of Complications, Morbidity, and/or Mortality  Presenting problems: high  Diagnostic procedures: high  Management options: high    Patient Progress  Patient progress: stable    CritCare Time    Disposition  Final diagnoses:   Upper respiratory infection   Hypoxia     Time reflects when diagnosis was documented in both MDM as applicable and the Disposition within this note     Time User Action Codes Description Comment    1/4/2019  8:09 PM Kevin MONTIEL Add [J06 9] Upper respiratory infection     1/4/2019  8:09 PM Tesfaye Abreu Add [R09 02] Hypoxia       ED Disposition     ED Disposition Condition Comment    Admit  Case was discussed with Nancy MONTIEL   and the patient's admission status was agreed to be Admission Status: inpatient status to the service of Dr Nathan Lynn   Follow-up Information     Follow up With Specialties Details Why Contact Info    [employer]  Follow up  [phone]            Patient's Medications    No medications on file     No discharge procedures on file      ED Provider  Electronically Signed by           Travis Nettles MD  01/04/19 3699

## 2019-01-05 NOTE — ASSESSMENT & PLAN NOTE
· Patient reports she "might have a touch of COPD, I smoked for 50 years and occasionally get out of breath with exertion"  She has been told in the past that she might have underlying COPD with emphysema, but has never been formally diagnosed and has never been fully worked up  · Respiratory protocol, nebulized treatments  · Supplemental oxygen via nasal cannula to maintain saturations above 92%    · Suspect COPD exacerbation possible triggered by viral URI   · Appreciate pulmonology input; should have outpt PFTs   · Cont methylpred at current dose for now

## 2019-01-05 NOTE — ASSESSMENT & PLAN NOTE
· POA, as evidenced by tachypnea, tachycardia, suspected source URI, lactic acidosis and hypoxia present  No leukocytosis    · Improved with IVF; reduce IVF rate   · Treatment as per primary problem above

## 2019-01-05 NOTE — PROGRESS NOTES
Progress Note - Anne-Marie Garcia 1935, 80 y o  female MRN: [id number]    Unit/Bed#: -01 Encounter: [id number]    Primary Care Provider: Radha Cuba   Date and time admitted to hospital: 1/4/2019  6:06 PM    * Acute respiratory failure with hypoxia [facility])   Assessment & Plan    · Patient reports she "might have a touch of COPD, I smoked for 50 years and occasionally get out of breath with exertion"  She has been told in the past that she might have underlying COPD with emphysema, but has never been formally diagnosed and has never been fully worked up  · Respiratory protocol, nebulized treatments  · Supplemental oxygen via nasal cannula to maintain saturations above 92%  · Suspect COPD exacerbation possible triggered by viral URI   · Appreciate pulmonology input; should have outpt PFTs   · Cont methylpred at current dose for now      Acute lower respiratory infection   Assessment & Plan    · Patient presents with 5 days worth of increasing dyspnea, cough which is productive of white sputum  Afebrile, good p o  Intake  No reported sick contacts  · Chest Xray negative for any infiltrate as per my read, await official read  · Flu pending  · PCT <0 05  · Will continue azithro; dc ceftriaxone   · BMP and CBC      Severe sepsis ([address] )   Assessment & Plan    · POA, as evidenced by tachypnea, tachycardia, suspected source URI, lactic acidosis and hypoxia present  No leukocytosis    · Improved with IVF; reduce IVF rate   · Treatment as per primary problem above     Colonic mass   Assessment & Plan    · Diagnosed in August 2017 with obstruction mass in the mid ascending colon status post right hemicolectomy on 08/2017 showed adenocarcinoma the right colon, moderately differentiated with extension into the muscularis propria, stage IIIB ( T3 N1 M0) with 1/17 positive lymph nodes  · Not currently pursuing treatment  · Follows with Dr Yuniel Pineda       VTE Pharmacologic Prophylaxis:   Pharmacologic: Enoxaparin (Lovenox)  Mechanical VTE Prophylaxis in Place: Yes    Patient Centered Rounds: I have performed bedside rounds with nursing staff today  Discussions with Specialists or Other Care Team Provider: none     Education and Discussions with Family / Patient: patient; declined call to family     Time Spent for Care: 30 minutes  More than 50% of total time spent on counseling and coordination of care as described above  Current Length of Stay: 1 day(s)    Current Patient Status: Inpatient   Certification Statement: The patient will continue to require additional inpatient hospital stay due to hypoxic respiratory failure     Discharge Plan / Estimated Discharge Date: TBD based on clinical course    Code Status: Level 1 - Full Code    Subjective:   Pt is feeling better overall  Still SOB with exertion  No f/c  Non productive cough     Objective:     Vitals:   Temp (24hrs), Av °F (37 2 °C), Min:98 7 °F (37 1 °C), Max:99 3 °F (37 4 °C)    Temp:  [98 7 °F (37 1 °C)-99 3 °F (37 4 °C)] 98 7 °F (37 1 °C)  HR:  [] 94  Resp:  [18-22] 18  BP: (133-158)/(70-98) 134/81  SpO2:  [69 %-95 %] 94 %  Body mass index is 22 18 kg/m²  Input and Output Summary (last 24 hours): Intake/Output Summary (Last 24 hours) at 19 1040  Last data filed at 19 1034   Gross per 24 hour   Intake              150 ml   Output                0 ml   Net              150 ml       Physical Exam:     Physical Exam   Constitutional: She is oriented to person, place, and time  No distress  HENT:   Head: Normocephalic and atraumatic  Cardiovascular: Normal rate and regular rhythm  Exam reveals no friction rub  Pulmonary/Chest: Effort normal  She has wheezes (diffusely wheezy )  She has no rales  Poor airmovement    Abdominal: Soft  Bowel sounds are normal  She exhibits no distension  There is no tenderness  Musculoskeletal: She exhibits no edema  Neurological: She is alert and oriented to person, place, and time  Skin: Skin is warm and dry  She is not diaphoretic  Nursing note and vitals reviewed  Additional Data:     Labs:      Results from last 7 days  Lab Units 01/05/19  0454  01/04/19  1827   WBC Thousand/uL 5 79  --  6 93   HEMOGLOBIN g/dL 14 5  --  17 0*   HEMATOCRIT % 43 3  --  51 3*   PLATELETS Thousands/uL 162  < > 195   NEUTROS PCT %  --   --  78*   LYMPHS PCT %  --   --  11*   LYMPHO PCT % 4*  --   --    MONOS PCT %  --   --  9   MONO PCT % 4  --   --    EOS PCT % 0  --  0   < > = values in this interval not displayed  Results from last 7 days  Lab Units 01/05/19  0454 01/04/19  1827   POTASSIUM mmol/L 3 5 3 7   CHLORIDE mmol/L 103 99*   CO2 mmol/L 26 28   BUN mg/dL 12 13   CREATININE mg/dL 0 82 1 02   CALCIUM mg/dL 8 4 9 2   ALK PHOS U/L  --  73   ALT U/L  --  18   AST U/L  --  39       Results from last 7 days  Lab Units 01/04/19  1827   INR  1 06       * I Have Reviewed All Lab Data Listed Above  * Additional Pertinent Lab Tests Reviewed:  All Labs Within Last 24 Hours Reviewed    Imaging:    Imaging Reports Reviewed Today Include:   Imaging Personally Reviewed by Myself Includes:      Recent Cultures (last 7 days):           Last 24 Hours Medication List:     Current Facility-Administered Medications:  acetaminophen 650 mg Oral Q6H PRN Julieta Cheri PA-JAMISON    azithromycin 250 mg Oral Q24H Vidya Egan MD    benzonatate 100 mg Oral TID PRN Julietapatt Alonzo PA-JAMIOSN    enoxaparin 40 mg Subcutaneous Daily JASBIR Chairez-JAMISON    guaiFENesin 600 mg Oral Q12H Rodrigue Gordon PA-C    ipratropium 0 5 mg Nebulization Q6H Vidya Egan MD    levalbuterol 1 25 mg Nebulization Q6H Vidya Egan MD    methylPREDNISolone sodium succinate 40 mg Intravenous [facility] JASBIR Chairez-JAMISON    ondansetron 4 mg Intravenous Q6H PRN Julieta Cheri PA-JAMISON    phenol 1 spray Mouth/Throat Q2H PRN Julieta Cheri PA-C    sodium chloride 125 mL/hr Intravenous Continuous JulietaJASBIR Philippe-JAMISON Last Rate: 125 mL/hr (01/05/19 [id number] [id number])        Today, Patient Was Seen By: David Mathis MD    ** Please Note: This note has been constructed using a voice recognition system   **

## 2019-01-06 ENCOUNTER — APPOINTMENT (INPATIENT)
Dept: NON INVASIVE DIAGNOSTICS | Facility: HOSPITAL | Age: 84
DRG: 193 | End: 2019-01-06
Payer: COMMERCIAL

## 2019-01-06 LAB
ANION GAP SERPL CALCULATED.3IONS-SCNC: 8 MMOL/L (ref 4–13)
BASOPHILS # BLD AUTO: 0.03 THOUSANDS/ΜL (ref 0–0.1)
BASOPHILS NFR BLD AUTO: 0 % (ref 0–1)
BUN SERPL-MCNC: 14 MG/DL (ref 5–25)
CALCIUM SERPL-MCNC: 8.8 MG/DL (ref 8.3–10.1)
CHLORIDE SERPL-SCNC: 105 MMOL/L (ref 100–108)
CO2 SERPL-SCNC: 26 MMOL/L (ref 21–32)
CREAT SERPL-MCNC: 0.78 MG/DL (ref 0.6–1.3)
EOSINOPHIL # BLD AUTO: 0 THOUSAND/ΜL (ref 0–0.61)
EOSINOPHIL NFR BLD AUTO: 0 % (ref 0–6)
ERYTHROCYTE [DISTWIDTH] IN BLOOD BY AUTOMATED COUNT: 13 % (ref 11.6–15.1)
GFR SERPL CREATININE-BSD FRML MDRD: 71 ML/MIN/1.73SQ M
GLUCOSE SERPL-MCNC: 133 MG/DL (ref 65–140)
HCT VFR BLD AUTO: 43.7 % (ref 34.8–46.1)
HGB BLD-MCNC: 14.4 G/DL (ref 11.5–15.4)
IMM GRANULOCYTES # BLD AUTO: 0.04 THOUSAND/UL (ref 0–0.2)
IMM GRANULOCYTES NFR BLD AUTO: 0 % (ref 0–2)
LYMPHOCYTES # BLD AUTO: 0.61 THOUSANDS/ΜL (ref 0.6–4.47)
LYMPHOCYTES NFR BLD AUTO: 5 % (ref 14–44)
MCH RBC QN AUTO: 33.5 PG (ref 26.8–34.3)
MCHC RBC AUTO-ENTMCNC: 33 G/DL (ref 31.4–37.4)
MCV RBC AUTO: 102 FL (ref 82–98)
MONOCYTES # BLD AUTO: 0.45 THOUSAND/ΜL (ref 0.17–1.22)
MONOCYTES NFR BLD AUTO: 4 % (ref 4–12)
NEUTROPHILS # BLD AUTO: 10.22 THOUSANDS/ΜL (ref 1.85–7.62)
NEUTS SEG NFR BLD AUTO: 91 % (ref 43–75)
NRBC BLD AUTO-RTO: 0 /100 WBCS
PLATELET # BLD AUTO: 178 THOUSANDS/UL (ref 149–390)
PMV BLD AUTO: 9.2 FL (ref 8.9–12.7)
POTASSIUM SERPL-SCNC: 3.8 MMOL/L (ref 3.5–5.3)
RBC # BLD AUTO: 4.3 MILLION/UL (ref 3.81–5.12)
SODIUM SERPL-SCNC: 139 MMOL/L (ref 136–145)
WBC # BLD AUTO: 11.35 THOUSAND/UL (ref 4.31–10.16)

## 2019-01-06 PROCEDURE — 94760 N-INVAS EAR/PLS OXIMETRY 1: CPT

## 2019-01-06 PROCEDURE — 93306 TTE W/DOPPLER COMPLETE: CPT | Performed by: INTERNAL MEDICINE

## 2019-01-06 PROCEDURE — 85025 COMPLETE CBC W/AUTO DIFF WBC: CPT | Performed by: HOSPITALIST

## 2019-01-06 PROCEDURE — 99232 SBSQ HOSP IP/OBS MODERATE 35: CPT | Performed by: INTERNAL MEDICINE

## 2019-01-06 PROCEDURE — 93306 TTE W/DOPPLER COMPLETE: CPT

## 2019-01-06 PROCEDURE — 94640 AIRWAY INHALATION TREATMENT: CPT

## 2019-01-06 PROCEDURE — 80048 BASIC METABOLIC PNL TOTAL CA: CPT | Performed by: HOSPITALIST

## 2019-01-06 PROCEDURE — 87449 NOS EACH ORGANISM AG IA: CPT | Performed by: PHYSICIAN ASSISTANT

## 2019-01-06 PROCEDURE — 99232 SBSQ HOSP IP/OBS MODERATE 35: CPT | Performed by: HOSPITALIST

## 2019-01-06 RX ORDER — LEVALBUTEROL 1.25 MG/.5ML
1.25 SOLUTION, CONCENTRATE RESPIRATORY (INHALATION)
Status: DISCONTINUED | OUTPATIENT
Start: 2019-01-06 | End: 2019-01-10 | Stop reason: HOSPADM

## 2019-01-06 RX ADMIN — METHYLPREDNISOLONE SODIUM SUCCINATE 40 MG: 40 INJECTION, POWDER, FOR SOLUTION INTRAMUSCULAR; INTRAVENOUS at 06:17

## 2019-01-06 RX ADMIN — ENOXAPARIN SODIUM 40 MG: 40 INJECTION SUBCUTANEOUS at 09:17

## 2019-01-06 RX ADMIN — LEVALBUTEROL HYDROCHLORIDE 1.25 MG: 1.25 SOLUTION, CONCENTRATE RESPIRATORY (INHALATION) at 20:25

## 2019-01-06 RX ADMIN — LEVALBUTEROL HYDROCHLORIDE 1.25 MG: 1.25 SOLUTION, CONCENTRATE RESPIRATORY (INHALATION) at 08:06

## 2019-01-06 RX ADMIN — METHYLPREDNISOLONE SODIUM SUCCINATE 40 MG: 40 INJECTION, POWDER, FOR SOLUTION INTRAMUSCULAR; INTRAVENOUS at 22:11

## 2019-01-06 RX ADMIN — GUAIFENESIN 600 MG: 600 TABLET, EXTENDED RELEASE ORAL at 09:17

## 2019-01-06 RX ADMIN — LEVALBUTEROL HYDROCHLORIDE 1.25 MG: 1.25 SOLUTION, CONCENTRATE RESPIRATORY (INHALATION) at 13:29

## 2019-01-06 RX ADMIN — IPRATROPIUM BROMIDE 0.5 MG: 0.5 SOLUTION RESPIRATORY (INHALATION) at 01:07

## 2019-01-06 RX ADMIN — IPRATROPIUM BROMIDE 0.5 MG: 0.5 SOLUTION RESPIRATORY (INHALATION) at 20:25

## 2019-01-06 RX ADMIN — IPRATROPIUM BROMIDE 0.5 MG: 0.5 SOLUTION RESPIRATORY (INHALATION) at 08:06

## 2019-01-06 RX ADMIN — LEVALBUTEROL HYDROCHLORIDE 1.25 MG: 1.25 SOLUTION, CONCENTRATE RESPIRATORY (INHALATION) at 01:07

## 2019-01-06 RX ADMIN — IPRATROPIUM BROMIDE 0.5 MG: 0.5 SOLUTION RESPIRATORY (INHALATION) at 13:29

## 2019-01-06 RX ADMIN — METHYLPREDNISOLONE SODIUM SUCCINATE 40 MG: 40 INJECTION, POWDER, FOR SOLUTION INTRAMUSCULAR; INTRAVENOUS at 13:41

## 2019-01-06 NOTE — ASSESSMENT & PLAN NOTE
· Patient reports she "might have a touch of COPD, I smoked for 50 years and occasionally get out of breath with exertion"  She has been told in the past that she might have underlying COPD with emphysema, but has never been formally diagnosed and has never been fully worked up  · Respiratory protocol, nebulized treatments  · Supplemental oxygen via nasal cannula to maintain saturations above 92%  · Suspect bronchospasm/? COPD exacerbation triggered by viral URI   · Appreciate pulmonology input; should have outpt PFTs   · Cont IV steroids at current schedule--> still has significant wheeze

## 2019-01-06 NOTE — ASSESSMENT & PLAN NOTE
· Diagnosed in August 2017 with obstruction mass in the mid ascending colon status post right hemicolectomy on 08/2017 showed adenocarcinoma the right colon, moderately differentiated with extension into the muscularis propria, stage IIIB ( T3 N1 M0) with 1/17 positive lymph nodes  · Not currently pursuing treatment  · Follows with Dr Elyn Kanner

## 2019-01-06 NOTE — PROGRESS NOTES
Progress Note - Slade Berkowitz 1935, 80 y o  female MRN: [id number]    Unit/Bed#: -01 Encounter: [id number]    Primary Care Provider: Jennifer Serrato   Date and time admitted to hospital: 1/4/2019  6:06 PM    * Acute respiratory failure with hypoxia [facility])   Assessment & Plan    · Patient reports she "might have a touch of COPD, I smoked for 50 years and occasionally get out of breath with exertion"  She has been told in the past that she might have underlying COPD with emphysema, but has never been formally diagnosed and has never been fully worked up  · Respiratory protocol, nebulized treatments  · Supplemental oxygen via nasal cannula to maintain saturations above 92%  · Suspect bronchospasm/? COPD exacerbation triggered by viral URI   · Appreciate pulmonology input; should have outpt PFTs   · Cont IV steroids at current schedule--> still has significant wheeze      RSV (respiratory syncytial virus pneumonia)   Assessment & Plan    · Patient presents with 5 days worth of increasing dyspnea, cough which is productive of white sputum  Afebrile, good p o  Intake  No reported sick contacts  · Chest Xray negative for any infiltrate  · RSV positive   · PCT <0 05  · BMP and CBC      Severe sepsis (HCC)   Assessment & Plan    · POA, as evidenced by tachypnea, tachycardia, suspected source URI, lactic acidosis and hypoxia present  No leukocytosis    · Improved with IVF; reduce IVF rate   · Treatment as per primary problem above     Colonic mass   Assessment & Plan    · Diagnosed in August 2017 with obstruction mass in the mid ascending colon status post right hemicolectomy on 08/2017 showed adenocarcinoma the right colon, moderately differentiated with extension into the muscularis propria, stage IIIB ( T3 N1 M0) with 1/17 positive lymph nodes  · Not currently pursuing treatment  · Follows with Dr Deb Fowler         VTE Pharmacologic Prophylaxis:   Pharmacologic: Enoxaparin (Lovenox)  Mechanical VTE Prophylaxis in Place: Yes    Patient Centered Rounds: I have performed bedside rounds with nursing staff today  Discussions with Specialists or Other Care Team Provider: pulmonology     Education and Discussions with Family / Patient: patient     Time Spent for Care: 30 minutes  More than 50% of total time spent on counseling and coordination of care as described above  Current Length of Stay: 2 day(s)    Current Patient Status: Inpatient   Certification Statement: The patient will continue to require additional inpatient hospital stay due to RSV with bronchospasm and hypoxia     Discharge Plan / Estimated Discharge Date: TBD based on clinical course     Code Status: Level 1 - Full Code    Subjective:   Pt is feeling a little better but still has significant wheeze  No f/c  Objective:     Vitals:   Temp (24hrs), Av 2 °F (36 8 °C), Min:97 9 °F (36 6 °C), Max:98 6 °F (37 °C)    Temp:  [97 9 °F (36 6 °C)-98 6 °F (37 °C)] 98 °F (36 7 °C)  HR:  [87-89] 87  Resp:  [18] 18  BP: (104-160)/(59-67) 142/67  SpO2:  [91 %-96 %] 91 %  Body mass index is 22 18 kg/m²  Input and Output Summary (last 24 hours): Intake/Output Summary (Last 24 hours) at 19 1029  Last data filed at 19 0333   Gross per 24 hour   Intake             1460 ml   Output              150 ml   Net             1310 ml       Physical Exam:     Physical Exam   Constitutional: She is oriented to person, place, and time  No distress  HENT:   Head: Normocephalic and atraumatic  Mouth/Throat: No oropharyngeal exudate  Pulmonary/Chest: Effort normal  No respiratory distress  She has wheezes  Poor air movement and wheeze throughout lung fields   Mildly dyspneic with conversation    Abdominal: Soft  Bowel sounds are normal  She exhibits no distension  There is no tenderness  There is no rebound  Neurological: She is alert and oriented to person, place, and time  Skin: Skin is warm and dry  She is not diaphoretic  Psychiatric: She has a normal mood and affect  Her behavior is normal    Nursing note and vitals reviewed  Additional Data:     Labs:      Results from last 7 days  Lab Units 01/06/19  0543   WBC Thousand/uL 11 35*   HEMOGLOBIN g/dL 14 4   HEMATOCRIT % 43 7   PLATELETS Thousands/uL 178   NEUTROS PCT % 91*   LYMPHS PCT % 5*   MONOS PCT % 4   EOS PCT % 0       Results from last 7 days  Lab Units 01/06/19  0543  01/04/19  1827   POTASSIUM mmol/L 3 8  < > 3 7   CHLORIDE mmol/L 105  < > 99*   CO2 mmol/L 26  < > 28   BUN mg/dL 14  < > 13   CREATININE mg/dL 0 78  < > 1 02   CALCIUM mg/dL 8 8  < > 9 2   ALK PHOS U/L  --   --  73   ALT U/L  --   --  18   AST U/L  --   --  39   < > = values in this interval not displayed  Results from last 7 days  Lab Units 01/04/19  1827   INR  1 06       * I Have Reviewed All Lab Data Listed Above  * Additional Pertinent Lab Tests Reviewed: All Labs Within Last 24 Hours Reviewed    Imaging:    Imaging Reports Reviewed Today Include:   Imaging Personally Reviewed by Myself Includes:      Recent Cultures (last 7 days):       Results from last 7 days  Lab Units 01/04/19  2120 01/04/19  1830 01/04/19  1827   BLOOD CULTURE   --  No Growth at 24 hrs  No Growth at 24 hrs     INFLUENZA B PCR  None Detected  --   --    RSV PCR  Detected*  --   --        Last 24 Hours Medication List:     Current Facility-Administered Medications:  acetaminophen 650 mg Oral Q6H PRN Carol Ann Arrieta PA-C    benzonatate 100 mg Oral TID PRN Carol Ann Arrieta PA-C    enoxaparin 40 mg Subcutaneous Daily Carol Ann Arrieta PA-C    guaiFENesin 600 mg Oral Q12H [facility] Carol Ann Arrieta PA-C    ipratropium 0 5 mg Nebulization TID Rebecca Cooper MD    levalbuterol 1 25 mg Nebulization TID Rebecca Cooper MD    methylPREDNISolone sodium succinate 40 mg Intravenous [facility] Carol Ann Arrieta PA-C    ondansetron 4 mg Intravenous Q6H PRN Carol Ann Arrieta PA-C    phenol 1 spray Mouth/Throat Q2H PRN Carol Ann Arrieta PA-C    sodium chloride 75 mL/hr Intravenous Continuous Jeanette Dempsey MD Last Rate: 75 mL/hr (01/05/19 5200)        Today, Patient Was Seen By: Jeanette Dempsey MD    ** Please Note: This note has been constructed using a voice recognition system   **

## 2019-01-06 NOTE — PROGRESS NOTES
Pulmonary Progress Note  Beronica Campos 80 y o  female MRN: [id number]  Unit/Bed#: -01 Encounter: [id number]      Assesment and Recommendations:    1  Acute hypoxic respiratory failure secondary to right lower lobe RSV pneumonia  Continues with bronchospasm/wheezing  Still with significant desaturation today when walking to the bathroom on room air  · Continue Solu-Medrol q 8 hours  · Continue supplemental oxygen, wean for sats greater than 88%  · Xopenex/Atrovent q 6 hours     2  Probable COPD of unknown severity with acute exacerbation  · Will need outpatient PFTs/pulmonary follow-up      Chief Complaint:  Shortness of breath    Subjective: The patient states that she continues to have shortness of breath on exertion  She can hear herself wheezing still  She denies any chest pain  She continues with cough  Vitals: Blood pressure 142/67, pulse 87, temperature 98 °F (36 7 °C), temperature source Oral, resp  rate 18, height 5' 3" (1 6 m), weight 56 8 kg (125 lb 3 5 oz), SpO2 (!) 84 % , room air with ambulation, Body mass index is 22 18 kg/m²        Intake/Output Summary (Last 24 hours) at 01/06/19 1417  Last data filed at 01/06/19 0333   Gross per 24 hour   Intake                0 ml   Output              150 ml   Net             -150 ml       Physical exam:  General:  Patient is awake, alert, non-toxic and in no acute respiratory distress  Neck: No JVD  CV:  Regular, +S1 and S2, No murmurs, gallops or rubs appreciated  Lungs:  Decreased breath sounds in all lung fields with diffuse expiratory wheeze bilateral  Abdomen: Soft, +BS, Non-tender, non-distended  Extremities: No clubbing, cyanosis or edema  Neuro: No focal deficits    Labs:   CBC:   Lab Results   Component Value Date    WBC 11 35 (H) 01/06/2019    HGB 14 4 01/06/2019    HCT 43 7 01/06/2019     (H) 01/06/2019     01/06/2019    MCH 33 5 01/06/2019    MCHC 33 0 01/06/2019    RDW 13 0 01/06/2019    MPV 9 2 01/06/2019    NRBC 0 01/06/2019   , CMP:   Lab Results   Component Value Date    SODIUM 139 01/06/2019    K 3 8 01/06/2019     01/06/2019    CO2 26 01/06/2019    BUN 14 01/06/2019    CREATININE 0 78 01/06/2019    CALCIUM 8 8 01/06/2019    EGFR 71 01/06/2019       Rudean Gitelman, DO      Portions of the record may have been created with voice recognition software  Occasional wrong word or "sound a like" substitutions may have occurred due to the inherent limitations of voice recognition software  Read the chart carefully and recognize, using context, where substitutions have occurred

## 2019-01-06 NOTE — ASSESSMENT & PLAN NOTE
· Patient presents with 5 days worth of increasing dyspnea, cough which is productive of white sputum  Afebrile, good p o  Intake  No reported sick contacts    · Chest Xray negative for any infiltrate  · RSV positive   · PCT <0 05  · BMP and CBC

## 2019-01-07 LAB
ANION GAP SERPL CALCULATED.3IONS-SCNC: 6 MMOL/L (ref 4–13)
BASOPHILS # BLD AUTO: 0.04 THOUSANDS/ΜL (ref 0–0.1)
BASOPHILS NFR BLD AUTO: 0 % (ref 0–1)
BUN SERPL-MCNC: 21 MG/DL (ref 5–25)
CALCIUM SERPL-MCNC: 8.8 MG/DL (ref 8.3–10.1)
CHLORIDE SERPL-SCNC: 105 MMOL/L (ref 100–108)
CO2 SERPL-SCNC: 29 MMOL/L (ref 21–32)
CREAT SERPL-MCNC: 0.76 MG/DL (ref 0.6–1.3)
EOSINOPHIL # BLD AUTO: 0 THOUSAND/ΜL (ref 0–0.61)
EOSINOPHIL NFR BLD AUTO: 0 % (ref 0–6)
ERYTHROCYTE [DISTWIDTH] IN BLOOD BY AUTOMATED COUNT: 13.2 % (ref 11.6–15.1)
GFR SERPL CREATININE-BSD FRML MDRD: 73 ML/MIN/1.73SQ M
GLUCOSE SERPL-MCNC: 119 MG/DL (ref 65–140)
HCT VFR BLD AUTO: 44.4 % (ref 34.8–46.1)
HGB BLD-MCNC: 14.4 G/DL (ref 11.5–15.4)
IMM GRANULOCYTES # BLD AUTO: 0.09 THOUSAND/UL (ref 0–0.2)
IMM GRANULOCYTES NFR BLD AUTO: 1 % (ref 0–2)
L PNEUMO1 AG UR QL IA.RAPID: NEGATIVE
LYMPHOCYTES # BLD AUTO: 1.08 THOUSANDS/ΜL (ref 0.6–4.47)
LYMPHOCYTES NFR BLD AUTO: 8 % (ref 14–44)
MCH RBC QN AUTO: 33.4 PG (ref 26.8–34.3)
MCHC RBC AUTO-ENTMCNC: 32.4 G/DL (ref 31.4–37.4)
MCV RBC AUTO: 103 FL (ref 82–98)
MONOCYTES # BLD AUTO: 0.47 THOUSAND/ΜL (ref 0.17–1.22)
MONOCYTES NFR BLD AUTO: 3 % (ref 4–12)
NEUTROPHILS # BLD AUTO: 12.34 THOUSANDS/ΜL (ref 1.85–7.62)
NEUTS SEG NFR BLD AUTO: 88 % (ref 43–75)
NRBC BLD AUTO-RTO: 0 /100 WBCS
PLATELET # BLD AUTO: 192 THOUSANDS/UL (ref 149–390)
PMV BLD AUTO: 9.9 FL (ref 8.9–12.7)
POTASSIUM SERPL-SCNC: 4.6 MMOL/L (ref 3.5–5.3)
RBC # BLD AUTO: 4.31 MILLION/UL (ref 3.81–5.12)
S PNEUM AG UR QL: NEGATIVE
SODIUM SERPL-SCNC: 140 MMOL/L (ref 136–145)
WBC # BLD AUTO: 14.02 THOUSAND/UL (ref 4.31–10.16)

## 2019-01-07 PROCEDURE — 85025 COMPLETE CBC W/AUTO DIFF WBC: CPT | Performed by: HOSPITALIST

## 2019-01-07 PROCEDURE — 94640 AIRWAY INHALATION TREATMENT: CPT

## 2019-01-07 PROCEDURE — 94668 MNPJ CHEST WALL SBSQ: CPT

## 2019-01-07 PROCEDURE — 99232 SBSQ HOSP IP/OBS MODERATE 35: CPT | Performed by: INTERNAL MEDICINE

## 2019-01-07 PROCEDURE — 94664 DEMO&/EVAL PT USE INHALER: CPT

## 2019-01-07 PROCEDURE — 80048 BASIC METABOLIC PNL TOTAL CA: CPT | Performed by: HOSPITALIST

## 2019-01-07 PROCEDURE — 99232 SBSQ HOSP IP/OBS MODERATE 35: CPT | Performed by: HOSPITALIST

## 2019-01-07 PROCEDURE — 94760 N-INVAS EAR/PLS OXIMETRY 1: CPT

## 2019-01-07 RX ORDER — FLUTICASONE FUROATE AND VILANTEROL 200; 25 UG/1; UG/1
1 POWDER RESPIRATORY (INHALATION) DAILY
Status: DISCONTINUED | OUTPATIENT
Start: 2019-01-07 | End: 2019-01-10 | Stop reason: HOSPADM

## 2019-01-07 RX ORDER — METHYLPREDNISOLONE SODIUM SUCCINATE 40 MG/ML
40 INJECTION, POWDER, LYOPHILIZED, FOR SOLUTION INTRAMUSCULAR; INTRAVENOUS EVERY 12 HOURS SCHEDULED
Status: DISCONTINUED | OUTPATIENT
Start: 2019-01-07 | End: 2019-01-10

## 2019-01-07 RX ORDER — FLUTICASONE PROPIONATE 50 MCG
1 SPRAY, SUSPENSION (ML) NASAL DAILY
Status: DISCONTINUED | OUTPATIENT
Start: 2019-01-07 | End: 2019-01-10 | Stop reason: HOSPADM

## 2019-01-07 RX ADMIN — IPRATROPIUM BROMIDE 0.5 MG: 0.5 SOLUTION RESPIRATORY (INHALATION) at 14:17

## 2019-01-07 RX ADMIN — LEVALBUTEROL HYDROCHLORIDE 1.25 MG: 1.25 SOLUTION, CONCENTRATE RESPIRATORY (INHALATION) at 19:44

## 2019-01-07 RX ADMIN — LEVALBUTEROL HYDROCHLORIDE 1.25 MG: 1.25 SOLUTION, CONCENTRATE RESPIRATORY (INHALATION) at 14:17

## 2019-01-07 RX ADMIN — METHYLPREDNISOLONE SODIUM SUCCINATE 40 MG: 40 INJECTION, POWDER, FOR SOLUTION INTRAMUSCULAR; INTRAVENOUS at 05:12

## 2019-01-07 RX ADMIN — ENOXAPARIN SODIUM 40 MG: 40 INJECTION SUBCUTANEOUS at 10:11

## 2019-01-07 RX ADMIN — IPRATROPIUM BROMIDE 0.5 MG: 0.5 SOLUTION RESPIRATORY (INHALATION) at 19:44

## 2019-01-07 RX ADMIN — GUAIFENESIN 600 MG: 600 TABLET, EXTENDED RELEASE ORAL at 21:13

## 2019-01-07 RX ADMIN — LEVALBUTEROL HYDROCHLORIDE 1.25 MG: 1.25 SOLUTION, CONCENTRATE RESPIRATORY (INHALATION) at 07:52

## 2019-01-07 RX ADMIN — IPRATROPIUM BROMIDE 0.5 MG: 0.5 SOLUTION RESPIRATORY (INHALATION) at 07:52

## 2019-01-07 RX ADMIN — FLUTICASONE PROPIONATE 1 SPRAY: 50 SPRAY, METERED NASAL at 13:48

## 2019-01-07 RX ADMIN — METHYLPREDNISOLONE SODIUM SUCCINATE 40 MG: 40 INJECTION, POWDER, FOR SOLUTION INTRAMUSCULAR; INTRAVENOUS at 21:09

## 2019-01-07 RX ADMIN — FLUTICASONE FUROATE AND VILANTEROL TRIFENATATE 1 PUFF: 200; 25 POWDER RESPIRATORY (INHALATION) at 16:06

## 2019-01-07 NOTE — PROGRESS NOTES
Progress Note - Radha Barros 1935, 80 y o  female MRN: [id number]    Unit/Bed#: -01 Encounter: [id number]    Primary Care Provider: Harish Means   Date and time admitted to hospital: 1/4/2019  6:06 PM    * Acute respiratory failure with hypoxia [facility])   Assessment & Plan    · Patient reports she "might have a touch of COPD, I smoked for 50 years and occasionally get out of breath with exertion"  She has been told in the past that she might have underlying COPD with emphysema, but has never been formally diagnosed and has never been fully worked up  · Respiratory protocol, nebulized treatments  · Supplemental oxygen via nasal cannula to maintain saturations above 92%  · Suspect bronchospasm/? COPD exacerbation triggered by viral URI   · Appreciate pulmonology input; should have outpt PFTs   · Cont IV steroids, but consider weaning frequency      RSV (respiratory syncytial virus pneumonia)   Assessment & Plan    · Patient presents with 5 days worth of increasing dyspnea, cough which is productive of white sputum  Afebrile, good p o  Intake  No reported sick contacts  · Chest Xray negative for any infiltrate  · RSV positive   · PCT <0 05       Severe sepsis (HCC)   Assessment & Plan    · POA, as evidenced by tachypnea, tachycardia, suspected source URI, lactic acidosis and hypoxia present  No leukocytosis    · Improved with IVF; reduce IVF rate   · Treatment as per primary problem above     Colonic mass   Assessment & Plan    · Diagnosed in August 2017 with obstruction mass in the mid ascending colon status post right hemicolectomy on 08/2017 showed adenocarcinoma the right colon, moderately differentiated with extension into the muscularis propria, stage IIIB ( T3 N1 M0) with 1/17 positive lymph nodes  · Not currently pursuing treatment  · Follows with Dr Albin Snowden       VTE Pharmacologic Prophylaxis:   Pharmacologic: Enoxaparin (Lovenox)  Mechanical VTE Prophylaxis in Place: Yes    Patient Centered Rounds: I have performed bedside rounds with nursing staff today  Discussions with Specialists or Other Care Team Provider: pulmonology     Education and Discussions with Family / Patient: patient     Time Spent for Care: 30 minutes  More than 50% of total time spent on counseling and coordination of care as described above  Current Length of Stay: 3 day(s)    Current Patient Status: Inpatient   Certification Statement: The patient will continue to require additional inpatient hospital stay due to RSV with significant bronchospasm and hypoxia; probable underlying COPD  Discharge Plan / Estimated Discharge Date: TBD based on clinical course; suspect another 48hours    Code Status: Level 1 - Full Code      Subjective:   Pt is feeling a little less wheezy today  Still very dyspneic with activity  Pt plans on being a little more active today to see how she feels  Objective:     Vitals:   Temp (24hrs), Av 6 °F (37 °C), Min:98 5 °F (36 9 °C), Max:98 7 °F (37 1 °C)    Temp:  [98 5 °F (36 9 °C)-98 7 °F (37 1 °C)] 98 5 °F (36 9 °C)  HR:  [70-88] 70  Resp:  [18] 18  BP: (119-127)/(63-78) 123/78  SpO2:  [84 %-97 %] 95 %  Body mass index is 22 18 kg/m²  Input and Output Summary (last 24 hours): Intake/Output Summary (Last 24 hours) at 19 3416  Last data filed at 19 2100   Gross per 24 hour   Intake                0 ml   Output              100 ml   Net             -100 ml       Physical Exam:     Physical Exam   Constitutional: She is oriented to person, place, and time  HENT:   Head: Normocephalic and atraumatic  Cardiovascular: Normal rate and regular rhythm  No murmur heard  Pulmonary/Chest: Effort normal  No respiratory distress  She has wheezes  She has no rales  Abdominal: Soft  Bowel sounds are normal  She exhibits no distension  There is no tenderness  Musculoskeletal: She exhibits no edema or tenderness     Neurological: She is alert and oriented to person, place, and time  Psychiatric: She has a normal mood and affect  Her behavior is normal    Nursing note and vitals reviewed  Additional Data:     Labs:      Results from last 7 days  Lab Units 01/07/19  0542   WBC Thousand/uL 14 02*   HEMOGLOBIN g/dL 14 4   HEMATOCRIT % 44 4   PLATELETS Thousands/uL 192   NEUTROS PCT % 88*   LYMPHS PCT % 8*   MONOS PCT % 3*   EOS PCT % 0       Results from last 7 days  Lab Units 01/07/19  0542  01/04/19  1827   POTASSIUM mmol/L 4 6  < > 3 7   CHLORIDE mmol/L 105  < > 99*   CO2 mmol/L 29  < > 28   BUN mg/dL 21  < > 13   CREATININE mg/dL 0 76  < > 1 02   CALCIUM mg/dL 8 8  < > 9 2   ALK PHOS U/L  --   --  73   ALT U/L  --   --  18   AST U/L  --   --  39   < > = values in this interval not displayed  Results from last 7 days  Lab Units 01/04/19  1827   INR  1 06       * I Have Reviewed All Lab Data Listed Above  * Additional Pertinent Lab Tests Reviewed: All Labs Within Last 24 Hours Reviewed    Imaging:    Imaging Reports Reviewed Today Include:   Imaging Personally Reviewed by Myself Includes:      Recent Cultures (last 7 days):       Results from last 7 days  Lab Units 01/06/19  2206 01/04/19  2120 01/04/19  1830 01/04/19  1827   BLOOD CULTURE   --   --  No Growth at 48 hrs  No Growth at 48 hrs     INFLUENZA B PCR   --  None Detected  --   --    RSV PCR   --  Detected*  --   --    LEGIONELLA URINARY ANTIGEN  Negative  --   --   --        Last 24 Hours Medication List:     Current Facility-Administered Medications:  acetaminophen 650 mg Oral Q6H PRN Steven Talbert PA-C    benzonatate 100 mg Oral TID PRN Steven Talbert PA-C    enoxaparin 40 mg Subcutaneous Daily Steven Talbert PA-C    guaiFENesin 600 mg Oral Q12H [address] Steven Talbert PA-C    ipratropium 0 5 mg Nebulization TID Scott Medel MD    levalbuterol 1 25 mg Nebulization TID Scott Medel MD    methylPREDNISolone sodium succinate 40 mg Intravenous [facility] Steven Talbert PA-C    ondansetron 4 mg Intravenous Q6H PRN Nathanael Rojo PA-C    phenol 1 spray Mouth/Throat Q2H PRN Nathanael Rojo PA-C    sodium chloride 75 mL/hr Intravenous Continuous Miracle Soliman MD Last Rate: 75 mL/hr (01/05/19 1350)        Today, Patient Was Seen By: Miracle Soliman MD    ** Please Note: This note has been constructed using a voice recognition system   **

## 2019-01-07 NOTE — PROGRESS NOTES
Progress Note - Pulmonary   Anne-Marie Garcia 80 y o  female MRN: [id number]  Unit/Bed#: -01 Encounter: [id number]    Assessment/Plan:    Acute hypoxic respiratory failure due to right lower lobe RSV pneumonia and associated acute bronchospasm   Titrate FiO2 to maintain saturations greater than or equal to 88%  Out of bed as tolerated  Check ambulatory pulse ox  Add incentive spirometer and flutter valve  Continue Mucinex  Questions answered regarding use and patient is agreeable to medication  Discussed risks and benefits  Supportive care as listed for viral infection  Continue Solu-Medrol, but decreased to 40 milligrams IV every 12 hours  Continue Xopenex/Atrovent 3 times daily  Postnasal drip   Start Flonase  Outpatient follow-up and testing pending hospital course  Will need PFTs and formal evaluation given significant tobacco abuse history and possible COPD  Discussed with primary team     Chief Complaint:    I feel better      Subjective:    Shanice Chandler reports she is feeling better  She was out of bed in the room today and was able to complete ADLs with minimal shortness of breath  She continues to cough and has scant mucus  She has not been taking her Mucinex due to questions  She denies chest pain  She feels overall improved  Objective:    Vitals: Blood pressure 123/78, pulse 70, temperature 98 5 °F (36 9 °C), temperature source Oral, resp  rate 18, height 5' 3" (1 6 m), weight 56 8 kg (125 lb 3 5 oz), SpO2 95 %  4 liters nasal cannula,Body mass index is 22 18 kg/m²        Intake/Output Summary (Last 24 hours) at 01/07/19 1234  Last data filed at 01/06/19 2100   Gross per 24 hour   Intake                0 ml   Output              100 ml   Net             -100 ml       Invasive Devices     Peripheral Intravenous Line            Peripheral IV 01/04/19 Left Antecubital 2 days                Physical Exam:     Physical Exam   Constitutional: She is oriented to person, place, and time  She appears well-developed and well-nourished  She is cooperative  Non-toxic appearance  No distress  HENT:   Head: Normocephalic and atraumatic  Mouth/Throat: No oropharyngeal exudate  Eyes: EOM are normal  No scleral icterus  Neck: Neck supple  No JVD present  No tracheal deviation present  Cardiovascular: Normal rate, regular rhythm, S1 normal and S2 normal   Exam reveals no gallop and no friction rub  No murmur heard  Heart sounds distant  Pulmonary/Chest: Effort normal  No accessory muscle usage or stridor  No tachypnea  No respiratory distress  She has no decreased breath sounds  She has wheezes  She has no rhonchi  She has no rales  She exhibits no tenderness  Scant diffuse wheezes with referred upper airway wheeze  Abdominal: Soft  Bowel sounds are normal  She exhibits no distension  There is no tenderness  There is no rebound and no guarding  Musculoskeletal: She exhibits no edema  Neurological: She is alert and oriented to person, place, and time  She has normal strength  No sensory deficit  GCS eye subscore is 4  GCS verbal subscore is 5  GCS motor subscore is 6  Skin: Skin is warm and dry  No abrasion, no ecchymosis, no lesion and no rash noted  She is not diaphoretic  No cyanosis or erythema  Psychiatric: She has a normal mood and affect  Her speech is normal and behavior is normal    Vitals reviewed      Labs:   CBC:   Lab Results   Component Value Date    WBC 14 02 (H) 01/07/2019    HGB 14 4 01/07/2019    HCT 44 4 01/07/2019     (H) 01/07/2019     01/07/2019    MCH 33 4 01/07/2019    MCHC 32 4 01/07/2019    RDW 13 2 01/07/2019    MPV 9 9 01/07/2019    NRBC 0 01/07/2019   , CMP:   Lab Results   Component Value Date    SODIUM 140 01/07/2019    K 4 6 01/07/2019     01/07/2019    CO2 29 01/07/2019    BUN 21 01/07/2019    CREATININE 0 76 01/07/2019    CALCIUM 8 8 01/07/2019    EGFR 73 01/07/2019     Urine strep and Legionella antigens negative    Influenza/RSV PCR positive for RSV    Blood cultures x2 no growth for 48 hours    Imaging and other studies: None today

## 2019-01-07 NOTE — UTILIZATION REVIEW
Judy Valenzuela RN Registered Nurse Signed   Utilization Review Date of Service: 1/5/2019  1:16 PM         []Hide copied text  Initial Clinical Review     [address] Utilization Review Department  Phone: [phone]; Fax [phone]  [email or web address]  ATTENTION: Please call with any questions or concerns to [phone]  and carefully listen to the prompts so that you are directed to the right person  Send all requests for admission clinical reviews, approved or denied determinations and any other requests to fax [phone]  All voicemails are confidential      Admission: Date/Time/Statement: 1/4/19 @ 2010            Orders Placed This Encounter   Procedures    Inpatient Admission (expected length of stay for this patient is greater than two midnights)       Standing Status:   Standing       Number of Occurrences:   1       Order Specific Question:   Admitting Physician       Answer:   Lorelei Elliott [[id number]]       Order Specific Question:   Level of Care       Answer:   Med Surg [16]       Order Specific Question:   Estimated length of stay       Answer:   More than 2 Midnights       Order Specific Question:   Certification       Answer:   I certify that inpatient services are medically necessary for this patient for a duration of greater than two midnights  See H&P and MD Progress Notes for additional information about the patient's course of treatment             ED: Date/Time/Mode of Arrival:             ED Arrival Information      Expected Arrival Acuity Means of Arrival Escorted By Service Admission Type     - 1/4/2019 17:58 Emergent Wheelchair Family Member General Medicine Emergency     Arrival Complaint     breathing problems/cough             Chief Complaint:        Chief Complaint   Patient presents with    Shortness of Breath       pt reports prod cough this week  states now SOB   denies any fevers           History of Illness:    Sheri Shravan is a 80 y  o  female who presents with five-day history of increasing dyspnea, malaise, productive cough   She denies any fevers and has had good p o  Intake   No congestion, chest pain, headaches, lightheadedness, dizziness, rhinorrhea, sore throat   No reported sick contacts  Hx of Pneumonia   She has not had her flu shot yet this year   In the past she has been told she may have some underlying COPD but has never been formally diagnosed or workup   She has a 50 pack-year smoking history, but has not smoked for the past 18 years   Presented to emergency department in respiratory distress with saturation of 69%   Currently is in no acute distress reports her breathing is much improved since treatment      ED Vital Signs:            ED Triage Vitals   Temperature Pulse Respirations Blood Pressure SpO2   01/04/19 1823 01/04/19 1809 01/04/19 1809 01/04/19 1809 01/04/19 1809   99 1 °F (37 3 °C) (!) 114 22 158/98 (!) 69 %       Temp Source Heart Rate Source Patient Position - Orthostatic VS BP Location FiO2 (%)   01/04/19 1809 01/04/19 1809 01/04/19 2034 01/04/19 2034 --   Oral Monitor Sitting Right arm         Pain Score           01/04/19 1809           No Pain            Wt Readings from Last 1 Encounters:   01/04/19 56 8 kg (125 lb 3 5 oz)         Vital Signs (abnormal):  T max 99 3 -  To NRB Mask 15L   Sat to 95% -  Weaned to 6 L on 1/5 - sat to 93%      Abnormal Labs/Diagnostic Test Results:  - 1/4 - Cl 99 - T Protein 8 6 - Rt bili 1 60 - lactic Acid 2 5 - Wbc 6 93 - H/H17 0/51  3     RSV detected   1/5 - BMP - normal = Wbc 5 79 - H/H 14 5/43  3     CXR  - Pulmonary emphysema   Suspicion of superimposed infiltrate within the right mid and lower lung field          ED Treatment:             Medication Administration from 01/04/2019 1758 to 01/04/2019 2059         Date/Time Order Dose Route Action       01/04/2019 1831 albuterol inhalation solution 10 mg 10 mg Nebulization Given       01/04/2019 1831 ipratropium (ATROVENT) 0 02 % inhalation solution 1 mg 1 mg Nebulization Given       01/04/2019 1832 sodium chloride 0 9 % inhalation solution 3 mL 3 mL Nebulization Given       01/04/2019 1832 sodium chloride 0 9 % inhalation solution 9 mL 9 mL Nebulization Given       01/04/2019 1941 ceftriaxone (ROCEPHIN) 1 g/50 mL in dextrose IVPB 1,000 mg Intravenous New Bag       01/04/2019 2000 azithromycin (ZITHROMAX) 500 mg in sodium chloride 0 9 % 250 mL IVPB 500 mg Intravenous New Bag         Past Medical/Surgical History:          Diagnosis    [facility]    Colon obstruction ([facility]    Disease of thyroid gland    Emphysema lung ([facility]    Emphysema/COPD ([facility]    [place] (hard of hearing)    Pneumonia    Pulmonary emphysema (HCC)         Admitting Diagnosis: Cough [R05]  Upper respiratory infection [J06 9]  Hypoxia [R09 02]  Trouble breathing [R06 89]     Age/Sex: 80 y o  female     Assessment/Plan:       Acute lower respiratory infection     · Patient presents with 5 days worth of increasing dyspnea, cough which is productive of white sputum   Afebrile, good p o  Intake   No reported sick contacts  Chest Xray  - Pulmonary emphysema  Suspicion of superimposed infiltrate within the right mid and lower lung field  · Check procalcitonin, strep and legionella antigens, sputum culture Gram stain, BC  · Rule out flu/rsv  · Respiratory protocol, nebulized treatments, antitussives, mucolytic, antipyretics, antiemetic, supplemental oxygen as needed to maintain saturations above 92%  · Continue ceftriaxone and azithromycin at this time  · Monitor BMP, CBC      Severe sepsis (HCC)     · POA, as evidenced by tachypnea, tachycardia, suspected source URI, lactic acidosis and hypoxia present  No leukocytosis    · Initial lactic acid 2 5, will give IVF and trend to normal  · Treatment as per primary problem above      Acute respiratory failure with hypoxia ([facility] )     · Patient reports she "might have a touch of COPD, I smoked for 50 years and occasionally get out of breath with exertion"   She has been told in the past that she might have underlying COPD with emphysema, but has never been formally diagnosed and has never been fully worked up  · Respiratory protocol, nebulized treatments, treatment of underlying infection  · Supplemental oxygen via nasal cannula to maintain saturations above 92%    · Obtain echo as patient reports intermittent lower leg edema, with murmur appreciated  · Pulmonology follow-up as an outpatient      Colonic mass     · Diagnosed in August 2017 with obstruction mass in the mid ascending colon status post right hemicolectomy on 08/2017 showed adenocarcinoma the right colon, moderately differentiated with extension into the muscularis propria, stage IIIB ( T3 N1 M0) with 1/17 positive lymph nodes  · Not currently pursuing treatment  · Follows with Dr Patsy Osborne               Admission Orders:  Scheduled Meds:   Current Facility-Administered Medications:  acetaminophen 650 mg Oral Q6H PRN     azithromycin 250 mg Oral Q24H     benzonatate 100 mg Oral TID PRN     enoxaparin 40 mg Subcutaneous Daily     guaiFENesin 600 mg Oral Q12H [address]     ipratropium 0 5 mg Nebulization Q6H     levalbuterol 1 25 mg Nebulization Q6H     methylPREDNISolone sodium succinate 40 mg Intravenous Q8H [address]     ondansetron 4 mg Intravenous Q6H PRN     phenol 1 spray Mouth/Throat Q2H PRN 1/5 x 1       Continuous Infusions:   sodium chloride 75 mL/hr      Nursing orders - VS q4 - Up & OOB as tolerated - SCD's to le's- respiratory protocol - O2 weaning - diet regular house - PT joe

## 2019-01-07 NOTE — ASSESSMENT & PLAN NOTE
· Patient reports she "might have a touch of COPD, I smoked for 50 years and occasionally get out of breath with exertion"  She has been told in the past that she might have underlying COPD with emphysema, but has never been formally diagnosed and has never been fully worked up  · Respiratory protocol, nebulized treatments  · Supplemental oxygen via nasal cannula to maintain saturations above 92%  · Suspect bronchospasm/? COPD exacerbation triggered by viral URI   · Appreciate pulmonology input; should have outpt PFTs   · Cont IV steroids, but consider weaning frequency

## 2019-01-07 NOTE — ASSESSMENT & PLAN NOTE
· Patient presents with 5 days worth of increasing dyspnea, cough which is productive of white sputum  Afebrile, good p o  Intake  No reported sick contacts    · Chest Xray negative for any infiltrate  · RSV positive   · PCT <0 05

## 2019-01-07 NOTE — ASSESSMENT & PLAN NOTE
· POA, as evidenced by tachypnea, tachycardia, suspected source URI, lactic acidosis and hypoxia present  No leukocytosis    · Treatment as per primary problem above

## 2019-01-08 PROBLEM — J98.01 BRONCHOSPASM, ACUTE: Status: ACTIVE | Noted: 2019-01-05

## 2019-01-08 LAB
ANION GAP SERPL CALCULATED.3IONS-SCNC: 6 MMOL/L (ref 4–13)
BASOPHILS # BLD MANUAL: 0 THOUSAND/UL (ref 0–0.1)
BASOPHILS NFR MAR MANUAL: 0 % (ref 0–1)
BUN SERPL-MCNC: 23 MG/DL (ref 5–25)
CALCIUM SERPL-MCNC: 8.9 MG/DL (ref 8.3–10.1)
CHLORIDE SERPL-SCNC: 104 MMOL/L (ref 100–108)
CO2 SERPL-SCNC: 29 MMOL/L (ref 21–32)
CREAT SERPL-MCNC: 0.8 MG/DL (ref 0.6–1.3)
EOSINOPHIL # BLD MANUAL: 0 THOUSAND/UL (ref 0–0.4)
EOSINOPHIL NFR BLD MANUAL: 0 % (ref 0–6)
ERYTHROCYTE [DISTWIDTH] IN BLOOD BY AUTOMATED COUNT: 12.9 % (ref 11.6–15.1)
GFR SERPL CREATININE-BSD FRML MDRD: 68 ML/MIN/1.73SQ M
GLUCOSE SERPL-MCNC: 126 MG/DL (ref 65–140)
HCT VFR BLD AUTO: 44 % (ref 34.8–46.1)
HGB BLD-MCNC: 14.5 G/DL (ref 11.5–15.4)
LYMPHOCYTES # BLD AUTO: 0.11 THOUSAND/UL (ref 0.6–4.47)
LYMPHOCYTES # BLD AUTO: 1 % (ref 14–44)
MCH RBC QN AUTO: 33.6 PG (ref 26.8–34.3)
MCHC RBC AUTO-ENTMCNC: 33 G/DL (ref 31.4–37.4)
MCV RBC AUTO: 102 FL (ref 82–98)
MONOCYTES # BLD AUTO: 0.53 THOUSAND/UL (ref 0–1.22)
MONOCYTES NFR BLD: 5 % (ref 4–12)
NEUTROPHILS # BLD MANUAL: 9.71 THOUSAND/UL (ref 1.85–7.62)
NEUTS BAND NFR BLD MANUAL: 1 % (ref 0–8)
NEUTS SEG NFR BLD AUTO: 90 % (ref 43–75)
NRBC BLD AUTO-RTO: 0 /100 WBCS
PLATELET # BLD AUTO: 224 THOUSANDS/UL (ref 149–390)
PLATELET BLD QL SMEAR: ADEQUATE
PMV BLD AUTO: 9.2 FL (ref 8.9–12.7)
POTASSIUM SERPL-SCNC: 4.7 MMOL/L (ref 3.5–5.3)
RBC # BLD AUTO: 4.31 MILLION/UL (ref 3.81–5.12)
SODIUM SERPL-SCNC: 139 MMOL/L (ref 136–145)
TOTAL CELLS COUNTED SPEC: 100
VARIANT LYMPHS # BLD AUTO: 3 %
WBC # BLD AUTO: 10.67 THOUSAND/UL (ref 4.31–10.16)

## 2019-01-08 PROCEDURE — 85007 BL SMEAR W/DIFF WBC COUNT: CPT | Performed by: HOSPITALIST

## 2019-01-08 PROCEDURE — 94761 N-INVAS EAR/PLS OXIMETRY MLT: CPT

## 2019-01-08 PROCEDURE — 99232 SBSQ HOSP IP/OBS MODERATE 35: CPT | Performed by: INTERNAL MEDICINE

## 2019-01-08 PROCEDURE — G8979 MOBILITY GOAL STATUS: HCPCS

## 2019-01-08 PROCEDURE — 94640 AIRWAY INHALATION TREATMENT: CPT

## 2019-01-08 PROCEDURE — 97163 PT EVAL HIGH COMPLEX 45 MIN: CPT

## 2019-01-08 PROCEDURE — 94760 N-INVAS EAR/PLS OXIMETRY 1: CPT

## 2019-01-08 PROCEDURE — G8978 MOBILITY CURRENT STATUS: HCPCS

## 2019-01-08 PROCEDURE — 94668 MNPJ CHEST WALL SBSQ: CPT

## 2019-01-08 PROCEDURE — 85027 COMPLETE CBC AUTOMATED: CPT | Performed by: HOSPITALIST

## 2019-01-08 PROCEDURE — 80048 BASIC METABOLIC PNL TOTAL CA: CPT | Performed by: HOSPITALIST

## 2019-01-08 RX ADMIN — LEVALBUTEROL HYDROCHLORIDE 1.25 MG: 1.25 SOLUTION, CONCENTRATE RESPIRATORY (INHALATION) at 19:45

## 2019-01-08 RX ADMIN — IPRATROPIUM BROMIDE 0.5 MG: 0.5 SOLUTION RESPIRATORY (INHALATION) at 19:45

## 2019-01-08 RX ADMIN — LEVALBUTEROL HYDROCHLORIDE 1.25 MG: 1.25 SOLUTION, CONCENTRATE RESPIRATORY (INHALATION) at 14:21

## 2019-01-08 RX ADMIN — ENOXAPARIN SODIUM 40 MG: 40 INJECTION SUBCUTANEOUS at 08:56

## 2019-01-08 RX ADMIN — FLUTICASONE FUROATE AND VILANTEROL TRIFENATATE 1 PUFF: 200; 25 POWDER RESPIRATORY (INHALATION) at 08:56

## 2019-01-08 RX ADMIN — LEVALBUTEROL HYDROCHLORIDE 1.25 MG: 1.25 SOLUTION, CONCENTRATE RESPIRATORY (INHALATION) at 08:11

## 2019-01-08 RX ADMIN — FLUTICASONE PROPIONATE 1 SPRAY: 50 SPRAY, METERED NASAL at 08:55

## 2019-01-08 RX ADMIN — METHYLPREDNISOLONE SODIUM SUCCINATE 40 MG: 40 INJECTION, POWDER, FOR SOLUTION INTRAMUSCULAR; INTRAVENOUS at 08:55

## 2019-01-08 RX ADMIN — IPRATROPIUM BROMIDE 0.5 MG: 0.5 SOLUTION RESPIRATORY (INHALATION) at 14:21

## 2019-01-08 RX ADMIN — GUAIFENESIN 600 MG: 600 TABLET, EXTENDED RELEASE ORAL at 21:54

## 2019-01-08 RX ADMIN — IPRATROPIUM BROMIDE 0.5 MG: 0.5 SOLUTION RESPIRATORY (INHALATION) at 08:11

## 2019-01-08 RX ADMIN — METHYLPREDNISOLONE SODIUM SUCCINATE 40 MG: 40 INJECTION, POWDER, FOR SOLUTION INTRAMUSCULAR; INTRAVENOUS at 21:54

## 2019-01-08 NOTE — PHYSICAL THERAPY NOTE
PHYSICAL THERAPY NOTE    Patient Name: Berta Loomis  IZDQN'D Date: 1/8/2019     AGE:   80 y o  Mrn:   [id number]  ADMIT DX:  Cough [R05]  Upper respiratory infection [J06 9]  Hypoxia [R09 02]  Trouble breathing [R06 89]    Past Medical History:   Diagnosis Date    Colon cancer [facility])     Status post resection 2017    Disease of thyroid gland     not on meds    Emphysema/COPD [facility])     former smoker    [place] (hard of hearing)     uses hearing aids    Pneumonia 2007    hospitalized     Length Of Stay: 4  PHYSICAL THERAPY EVALUATION :    01/08/19 1420   Note Type   Note type Eval only   Pain Assessment   Pain Assessment No/denies pain   Pain Score No Pain   Home Living   Type of [address] [place] [address] One level  (1 [address])   Bathroom Shower/Tub Walk-in shower   Bathroom Toilet Raised   Bathroom Equipment Grab bars in shower; Shower chair;Grab bars around toilet   [facility]   Additional Comments Pt  lives alone in [facility] with 1 Step to Enter   Prior Function   Level of [place] Independent with ADLs and functional mobility   Lives With Alone   Receives Help From Family   ADL Assistance Independent   IADLs Independent   Falls in the last 6 months 1 to 4   Vocational Retired   Comments PTA, Pt  independent with all ADLs, IADLs and functional mobility without an AD    Restrictions/Precautions   Weight Bearing Precautions Per Order No   Other Precautions O2;Fall Risk;Droplet precautions   General   Additional Pertinent History 79 yo F who presents with 5 day Hx of increasing dyspnea, maliase, productive cough  Denies fever, good oral intake, 50 pack year smoking Hx  Dx: Acute Respiratory failure, comoribidities include Colonic Mass, Severe Sepsis, RSV, Bronchospasm, Chronic Emphysema     Family/Caregiver Present No   Cognition   Overall Cognitive Status Impaired   Arousal/Participation Cooperative   Orientation Level Oriented X4   Memory Within functional limits   Following Commands Follows multistep commands without difficulty   Comments Pt  was identified with full name and birthdate   RUE Assessment   RUE Assessment (grossly 4-/5)   LUE Assessment   LUE Assessment (grossly 4-/5)   RLE Assessment   RLE Assessment WFL  (grossly 4/5)   LLE Assessment   LLE Assessment WFL  (grossly 4/5)   Coordination   Movements are Fluid and Coordinated 1   Sensation WFL   Light Touch   RLE Light Touch Grossly intact   LLE Light Touch Grossly intact   Bed Mobility   Supine to Sit 6  Modified independent   Additional items Increased time required; Bedrails   Sit to Supine 6  Modified independent   Additional items Bedrails; Increased time required   Transfers   Sit to Stand 5  Supervision   Additional items Increased time required   Stand to Sit 5  Supervision   Additional items Impulsive;Verbal cues  (to reach back for controlled descent)   Ambulation/Elevation   Gait pattern Improper Weight shift;Narrow TRACY;Shuffling; Short stride; Excessively slow   Gait Assistance 5  Supervision   Additional items Assist x 1;Verbal cues   Assistive Device None   Distance 60'x1    Balance   Static Sitting Fair +   Dynamic Sitting Fair +   Static Standing Fair -   Dynamic Standing Fair -   Ambulatory Fair -   Endurance Deficit   Endurance Deficit Yes   Endurance Deficit Description notable increased WoB AND SoB with exertion  (SPO2 92% on 3 5L/min)   Activity Tolerance   Activity Tolerance Patient limited by fatigue;Treatment limited secondary to medical complications (Comment)   Medical Staff Made [facility]   Nurse Made Aware Spoke to [facility]   Assessment   Prognosis Good   Problem List Decreased strength;Decreased endurance; Impaired balance;Decreased mobility   Assessment 81 yo F who presents with 5 day Hx of increasing dyspnea, maliase, productive cough  Denies fever, good oral intake, 50 pack year smoking Hx  Dx: Acute Respiratory failure  order placed for PT eval and tx, w/ activity order of up and OOB as tolerated  pt presents w/ comorbidities of  Colonic Mass, Severe Sepsis, RSV, Bronchospasm, Chronic Emphysema and personal factors of advanced age, inability to ambulate household distances, inability to navigate community distances, inability to navigate level surfaces w/o external assistance, unable to perform dynamic tasks in community, positive fall history and unable to perform physical activity  pt presents w/ weakness, decreased endurance, impaired balance, gait deviations, decreased safety awareness and fall risk  these impairments are evident in findings from physical examination (weakness and edema of extremities), mobility assessment (need for Supervision assist w/ all phases of mobility when usually mobilizing independently, tolerance to only 60 feet of ambulation and need for cueing for mobility technique), and Barthel Index: 85/100  pt needed input for task focus and mobility technique  pt is at risk for falls due to physical and safety awareness deficits  pt's clinical presentation is unstable/unpredictable (evident in need for assist w/ all phases of mobility when usually mobilizing independently, tolerance to only 60 feet of ambulation, declining oxygen saturation w/ low level of activity, need for supplemental oxygen in order to maintain oxygen saturation, need for input for mobility technique, need for input for task focus and mobility technique and need for input for mobility technique/safety)  pt needs inpatient PT tx to improve mobility deficits  discharge recommendation is for home PT to reduce fall risk and maximize level of functional independence  Goals   Patient Goals to walk to my pool in the summer time   STG Expiration Date 01/18/19   Short Term Goal #1 pt will:  Increase bilateral LE strength 1/2 grade to facilitate independent mobility, Perform all bed mobility tasks independently to decrease fall risk factors, Perform all transfers independently to improve independence, Ambulate 300 ft  without assistive device independently w/o LOB, Increase all balance 1/2 grade to decrease risk for falls and Improve Barthel Index score to 100 or greater to facilitate independence   Treatment Day 0   Plan   Treatment/Interventions Functional transfer training;LE strengthening/ROM; Therapeutic exercise; Endurance training;Equipment eval/education;Patient/family training;Bed mobility;Gait training;Spoke to nursing;Spoke to case management   PT Frequency (3-5x/week)   Recommendation   Recommendation Home PT   Equipment Recommended Walker   PT - OK to Discharge Yes   Additional Comments when medically appropriate   Barthel Index   Feeding 10   Bathing 0   Grooming Score 5   Dressing Score 10   Bladder Score 10   Bowels Score 10   Toilet Use Score 10   Transfers (Bed/Chair) Score 15   Mobility (Level Surface) Score 10   Stairs Score 5   Barthel Index Score 85     Skilled PT recommended while in hospital and upon DC to progress pt toward treatment goals       Dylon Paredes, PT 1/8/2019

## 2019-01-08 NOTE — RESPIRATORY THERAPY NOTE
Home Oxygen Qualifying Test       Patient name: Slade Berkowitz        : 1935   Date of Test:  2019  Diagnosis:      Home Oxygen Test:    **Medicare Guidelines require item(s) 1-5 on all ambulatory patients or 1 and 2 on non-ambulatory patients  1   Baseline SPO2 on Room Air at rest 89-90 %  2   SPO2 during exercise on Room Air 82 %  During exercise monitor SpO2  If SPO2 increases >=89% with ambulation do not add supplemental oxygen  If <= 88% on room air add O2 via NC and titrate patient  Patient must be ambulated with O2 and titrated to > 88% with exertion  3   SPO2 on Oxygen at rest n/a % n/a lpm     4   SPO2 during exercise on Oxygen  89-90% a liter flow of 4 lpm     5   Exercise performed:          walking, distance 120 (feet)          [x]  Supplemental Home Oxygen is indicated  []  Client does not qualify for home oxygen  Respiratory Additional Notes- Patient started ambulating with walker on room air  SpO2 dropped to 82% when she was walked for 20 feet  Patient was placed on oxygen and oxygen was increased to 4lpm to matain >88% while walking  Patient was walked for a total of 120 feet with walker  Patient did not experienced a shortness of breath and tolerated well       Fidel Menon 105, RT

## 2019-01-08 NOTE — PROGRESS NOTES
Progress Note - Ang Lares 1935, 80 y o  female MRN: [id number]    Unit/Bed#: -01 Encounter: [id number]    Primary Care Provider: Antonette Sullivan   Date and time admitted to hospital: 1/4/2019  6:06 PM     * Acute respiratory failure with hypoxia [facility])   Assessment & Plan    · Patient reports she "might have a touch of COPD, I smoked for 50 years and occasionally get out of breath with exertion"  She has been told in the past that she might have underlying COPD with emphysema, but has never been formally diagnosed and has never been fully worked up  · Respiratory protocol, nebulized treatments  · Supplemental oxygen via nasal cannula to maintain saturations above 92%  · Suspect bronchospasm/? COPD exacerbation triggered by viral URI   · Appreciate pulmonology input; should have outpt PFTs   · Cont IV steroids, but consider weaning frequency      Bronchospasm, acute   Assessment & Plan    Due to RSV; may have underlying COPD due to extensive smoking history  Continue steroids  Outpatient PFTs and pulmonology follow-up     RSV (respiratory syncytial virus pneumonia)   Assessment & Plan    · Patient presents with 5 days worth of increasing dyspnea, cough which is productive of white sputum  Afebrile, good p o  Intake  No reported sick contacts  · Chest Xray negative for any infiltrate  · RSV positive   · PCT <0 05       Colonic mass   Assessment & Plan    · Diagnosed in August 2017 with obstruction mass in the mid ascending colon status post right hemicolectomy on 08/2017 showed adenocarcinoma the right colon, moderately differentiated with extension into the muscularis propria, stage IIIB ( T3 N1 M0) with 1/17 positive lymph nodes  · Not currently pursuing treatment  · Follows with Dr Maria L Peoples     Severe sepsis [facility])   Assessment & Plan    · POA, as evidenced by tachypnea, tachycardia, suspected source URI, lactic acidosis and hypoxia present  No leukocytosis    · Treatment as per primary problem above       VTE Pharmacologic Prophylaxis:   Pharmacologic: Enoxaparin (Lovenox)  Mechanical VTE Prophylaxis in Place: Yes    Patient Centered Rounds: I have performed bedside rounds with nursing staff today  Discussions with Specialists or Other Care Team Provider:     Education and Discussions with Family / Patient:  Patient    Time Spent for Care: 20 minutes  More than 50% of total time spent on counseling and coordination of care as described above  Current Length of Stay: 4 day(s)    Current Patient Status: Inpatient   Certification Statement: The patient will continue to require additional inpatient hospital stay due to IV steroids    Discharge Plan:  Next 24-48 hours    Code Status: Level 1 - Full Code      Subjective:   Reports coughing with mucoid sputum  Objective:     Vitals:   Temp (24hrs), Av 9 °F (36 6 °C), Min:97 7 °F (36 5 °C), Max:98 1 °F (36 7 °C)    Temp:  [97 7 °F (36 5 °C)-98 1 °F (36 7 °C)] 97 7 °F (36 5 °C)  HR:  [63-79] 63  Resp:  [18-22] 18  BP: (116-135)/(64-76) 116/64  SpO2:  [93 %-97 %] 96 %  Body mass index is 22 18 kg/m²  Input and Output Summary (last 24 hours): Intake/Output Summary (Last 24 hours) at 19 1356  Last data filed at 19 0900   Gross per 24 hour   Intake              420 ml   Output              200 ml   Net              220 ml       Physical Exam:     Physical Exam     Gen -Patient comfortable at rest  Neck- Supple  No thyromegaly or lymphadenopathy  Lungs-mild wheezing  Heart S1-S2, regular rate and rhythm, no murmurs  Abdomen-soft nontender, no organomegaly   Bowel sounds present  Extremities-no cyanosi,  clubbing or edema  Skin- no rash  Neuro-nonfocal         Additional Data:     Labs:      Results from last 7 days  Lab Units 19  0619 19  0542   WBC Thousand/uL 10 67* 14 02*   HEMOGLOBIN g/dL 14 5 14 4   HEMATOCRIT % 44 0 44 4   PLATELETS Thousands/uL 224 192   BANDS PCT % 1  --    NEUTROS PCT %  --  88*   LYMPHS PCT %  -- 8*   LYMPHO PCT % 1*  --    MONOS PCT %  --  3*   MONO PCT % 5  --    EOS PCT % 0 0       Results from last 7 days  Lab Units 01/08/19  0619  01/04/19  1827   SODIUM mmol/L 139  < > 138   POTASSIUM mmol/L 4 7  < > 3 7   CHLORIDE mmol/L 104  < > 99*   CO2 mmol/L 29  < > 28   BUN mg/dL 23  < > 13   CREATININE mg/dL 0 80  < > 1 02   ANION GAP mmol/L 6  < > 11   CALCIUM mg/dL 8 9  < > 9 2   ALBUMIN g/dL  --   --  3 8   TOTAL BILIRUBIN mg/dL  --   --  1 60*   ALK PHOS U/L  --   --  73   ALT U/L  --   --  18   AST U/L  --   --  39   GLUCOSE RANDOM mg/dL 126  < > 122   < > = values in this interval not displayed  Results from last 7 days  Lab Units 01/04/19  1827   INR  1 06               Results from last 7 days  Lab Units 01/05/19  0454 01/05/19  0248 01/05/19  0039 01/04/19  2206   LACTIC ACID mmol/L 1 5 2 7* 3 2* 2 8*   PROCALCITONIN ng/ml  --   --   --  <0 05           * I Have Reviewed All Lab Data Listed Above  * Additional Pertinent Lab Tests Reviewed: All Labs Within Last 24 Hours Reviewed    Imaging:    Imaging Reports Reviewed Today Include:   Imaging Personally Reviewed by Myself Includes:      Recent Cultures (last 7 days):       Results from last 7 days  Lab Units 01/06/19  2206 01/04/19  2120 01/04/19  1830 01/04/19  1827   BLOOD CULTURE   --   --  No Growth at 72 hrs  No Growth at 72 hrs     INFLUENZA B PCR   --  None Detected  --   --    RSV PCR   --  Detected*  --   --    LEGIONELLA URINARY ANTIGEN  Negative  --   --   --        Last 24 Hours Medication List:     Current Facility-Administered Medications:  acetaminophen 650 mg Oral Q6H PRN Tavo Jaimes PA-C   benzonatate 100 mg Oral TID PRN Tavo Jaimes PA-C   enoxaparin 40 mg Subcutaneous Daily Tavo Jaimes PA-C   fluticasone 1 spray Each Nare Daily SULAIMAN Dow   fluticasone-vilanterol 1 puff Inhalation Daily Cyn Hand, DO   guaiFENesin 600 mg Oral Q12H [address] Tavo Jaimes PA-C   ipratropium 0 5 mg Nebulization TID [place] Migdalia Grimes MD   levalbuterol 1 25 mg Nebulization TID Joshua Kunz MD   methylPREDNISolone sodium succinate 40 mg Intravenous Q12H [address] SULAIMAN Slaughter   ondansetron 4 mg Intravenous Q6H PRN Cami Wynn PA-C   phenol 1 spray Mouth/Throat Q2H PRN Cami Wynn PA-C        Today, Patient Was Seen By: Rozina Bloom MD    ** Please Note: Dictation voice to text software may have been used in the creation of this document   **

## 2019-01-08 NOTE — ASSESSMENT & PLAN NOTE
Due to RSV; may have underlying COPD due to extensive smoking history  Continue steroids  Outpatient PFTs and pulmonology follow-up

## 2019-01-08 NOTE — PROGRESS NOTES
Progress Note - Pulmonary   Terrell Medicus 80 y o  female MRN: [id number]  Unit/Bed#: -01 Encounter: [id number]    Assessment/Plan:    Acute hypoxic respiratory failure due to right lower lobe RSV pneumonia and associated acute bronchospasm              Titrate FiO2 to maintain saturations greater than or equal to 88%  Out of bed as tolerated  Check ambulatory pulse ox  Add incentive spirometer (D/W nursing to give to patient today) and flutter valve (using)  Continue Mucinex  Supportive care as listed for viral infection  Continue Solu-Medrol 40 milligrams IV every 12 hours today  Continue Xopenex/Atrovent 3 times daily  Continue Breo  No increase in steroids or nebs at this time due to side effects unless worsening symptoms      Postnasal drip              Continue Flonase      Outpatient follow-up and testing pending hospital course  Will need PFTs and formal evaluation given significant tobacco abuse history and possible COPD  Discussed with primary team     Chief Complaint:    "I'm okay "    Subjective:    Марина in sitting up in bed  She just finished working with PT and reports she feels slightly SOB  Overall, she does say "I don't feel much different than normal aside from my cough " She continues with intractable cough with little mucous production  No chest pain or tightness  Pain with coughing at times  She does report that the steroid is making her hot and jittery, which is bothersome to her  Objective:    Vitals: Blood pressure 154/75, pulse 84, temperature 98 °F (36 7 °C), temperature source Oral, resp  rate 18, height 5' 3" (1 6 m), weight 56 8 kg (125 lb 3 5 oz), SpO2 92 %  3 5L NC,Body mass index is 22 18 kg/m²        Intake/Output Summary (Last 24 hours) at 01/08/19 1527  Last data filed at 01/08/19 0900   Gross per 24 hour   Intake              420 ml   Output              200 ml   Net 220 ml       Invasive Devices     Peripheral Intravenous Line            Peripheral IV 01/08/19 Right Arm less than 1 day                Physical Exam:     Physical Exam   Constitutional: She is oriented to person, place, and time  She appears well-developed and well-nourished  She is cooperative  Non-toxic appearance  No distress  HENT:   Head: Normocephalic and atraumatic  Mouth/Throat: No oropharyngeal exudate  Eyes: EOM are normal  No scleral icterus  Neck: Neck supple  No JVD present  No tracheal deviation present  Cardiovascular: Normal rate, regular rhythm, S1 normal and S2 normal   Exam reveals no gallop and no friction rub  No murmur heard  Pulmonary/Chest: Effort normal  No accessory muscle usage or stridor  No tachypnea  No respiratory distress  She has no decreased breath sounds  She has wheezes  She has no rhonchi  She has no rales  She exhibits no tenderness  Abdominal: Soft  Bowel sounds are normal  She exhibits no distension  There is no tenderness  There is no rebound and no guarding  Musculoskeletal: She exhibits no edema  Neurological: She is alert and oriented to person, place, and time  She has normal strength  No sensory deficit  GCS eye subscore is 4  GCS verbal subscore is 5  GCS motor subscore is 6  Skin: Skin is warm and dry  No abrasion, no ecchymosis, no lesion and no rash noted  She is not diaphoretic  No cyanosis or erythema  Psychiatric: She has a normal mood and affect  Her speech is normal and behavior is normal    Vitals reviewed      Labs:   CBC:   Lab Results   Component Value Date    WBC 10 67 (H) 01/08/2019    HGB 14 5 01/08/2019    HCT 44 0 01/08/2019     (H) 01/08/2019     01/08/2019    MCH 33 6 01/08/2019    MCHC 33 0 01/08/2019    RDW 12 9 01/08/2019    MPV 9 2 01/08/2019    NRBC 0 01/08/2019   , CMP:   Lab Results   Component Value Date    SODIUM 139 01/08/2019    K 4 7 01/08/2019     01/08/2019    CO2 29 01/08/2019 BUN 23 01/08/2019    CREATININE 0 80 01/08/2019    CALCIUM 8 9 01/08/2019    EGFR 68 01/08/2019       Imaging and other studies: None today

## 2019-01-08 NOTE — PLAN OF CARE
Problem: DISCHARGE PLANNING - CARE MANAGEMENT  Goal: Discharge to post-acute care or home with appropriate resources  INTERVENTIONS:  - Conduct assessment to determine patient/family and health care team treatment goals, and need for post-acute services based on payer coverage, community resources, and patient preferences, and barriers to discharge  - Address psychosocial, clinical, and financial barriers to discharge as identified in assessment in conjunction with the patient/family and health care team  - Arrange appropriate level of post-acute services according to patients   needs and preference and payer coverage in collaboration with the physician and health care team  - Communicate with and update the patient/family, physician, and health care team regarding progress on the discharge plan  - Arrange appropriate transportation to post-acute venues  Outcome: Progressing  LOS 4, not a bundle, not a readmit  CM met with Pt at bedside  Pt lives alone in a 1 level home with 1 CAMILLA  Pt does not use any DME  Pt was independent with ambulation and ADL's prior to admission  Pt reports having VNA in the past many years ago, not currently  [facility] to [facility] the past is [place]  Pt uses [facility] in [place]  Pt's daughter Kristen Summers is her POA  Pt drives herself to appointments  CM discusses physical therapy evaluation recommendation  Pt states she is unsure at this time if she wants home PT, outpatient PT, or no physical therapy at d/c  CM dept will follow up  Pt may need oxygen at discharge  Pt declines CM offer to contact family regarding discharge planning

## 2019-01-08 NOTE — ASSESSMENT & PLAN NOTE
· Diagnosed in August 2017 with obstruction mass in the mid ascending colon status post right hemicolectomy on 08/2017 showed adenocarcinoma the right colon, moderately differentiated with extension into the muscularis propria, stage IIIB ( T3 N1 M0) with 1/17 positive lymph nodes  · Not currently pursuing treatment  · Follows with Dr Khris Mcwilliams

## 2019-01-08 NOTE — PLAN OF CARE
Problem: PHYSICAL THERAPY ADULT  Goal: Performs mobility at highest level of function for planned discharge setting  See evaluation for individualized goals  Treatment/Interventions: Functional transfer training, LE strengthening/ROM, Therapeutic exercise, Endurance training, Equipment eval/education, Patient/family training, Bed mobility, Gait training, Spoke to nursing, Spoke to case management  Equipment Recommended: Brett Craft       See flowsheet documentation for full assessment, interventions and recommendations  Prognosis: Good  Problem List: Decreased strength, Decreased endurance, Impaired balance, Decreased mobility  Assessment: 81 yo F who presents with 5 day Hx of increasing dyspnea, maliase, productive cough  Denies fever, good oral intake, 50 pack year smoking Hx  Dx: Acute Respiratory failure  order placed for PT eval and tx, w/ activity order of up and OOB as tolerated  pt presents w/ comorbidities of  Colonic Mass, Severe Sepsis, RSV, Bronchospasm, Chronic Emphysema and personal factors of advanced age, inability to ambulate household distances, inability to navigate community distances, inability to navigate level surfaces w/o external assistance, unable to perform dynamic tasks in community, positive fall history and unable to perform physical activity  pt presents w/ weakness, decreased endurance, impaired balance, gait deviations, decreased safety awareness and fall risk  these impairments are evident in findings from physical examination (weakness and edema of extremities), mobility assessment (need for Supervision assist w/ all phases of mobility when usually mobilizing independently, tolerance to only 60 feet of ambulation and need for cueing for mobility technique), and Barthel Index: 85/100  pt needed input for task focus and mobility technique  pt is at risk for falls due to physical and safety awareness deficits   pt's clinical presentation is unstable/unpredictable (evident in need for assist w/ all phases of mobility when usually mobilizing independently, tolerance to only 60 feet of ambulation, declining oxygen saturation w/ low level of activity, need for supplemental oxygen in order to maintain oxygen saturation, need for input for mobility technique, need for input for task focus and mobility technique and need for input for mobility technique/safety)  pt needs inpatient PT tx to improve mobility deficits  discharge recommendation is for home PT to reduce fall risk and maximize level of functional independence  Recommendation: Home PT     PT - OK to Discharge: Yes    See flowsheet documentation for full assessment

## 2019-01-08 NOTE — UTILIZATION REVIEW
[address]  Utilization Review Department  Phone: [phone]; Fax [phone]  [email or web address]  ATTENTION: Please call with any questions or concerns to [phone]  and carefully listen to the prompts so that you are directed to the right person  Send all requests for admission clinical reviews, approved or denied determinations and any other requests to fax [phone]  All voicemails are confidential   Continued Stay Review    Date: 01/08/2019  LOS=4 days for: Acute hypoxic respiratory failure secondary to right lower lobe RSV pneumonia    Vital Signs: /64 (BP Location: Right arm)   Pulse 63   Temp 97 7 °F (36 5 °C) (Oral)   Resp 18   Ht 5' 3" (1 6 m)   Wt 56 8 kg (125 lb 3 5 oz)   SpO2 96%   BMI 22 18 kg/m²  On 4 Liters nasal cannula    Assessment/Plan: acute respiratory failure with hypoxia- wean supplemental O2 to maintain SpO2>92%; suspect bronchospasm ?? COPD exacerbation triggered by viral URI; appreciate Pulmonology input-should have outpatient PFTs; cont IV steroids-consider wean frequency  RSV pneumonia-cxr negative for infiltrate- RSV positive-presents with 5 days increasing dyspnea, cough with white sputum  Colonic mass- follows with Dr Luis Ovalles pursuing treatment  Severe sepsis; POA-cont treatment       Medications:   Scheduled Meds:   Current Facility-Administered Medications:  acetaminophen 650 mg Oral Q6H PRN   benzonatate 100 mg Oral TID PRN   enoxaparin 40 mg Subcutaneous Daily   fluticasone 1 spray Each Nare Daily   fluticasone-vilanterol 1 puff Inhalation Daily   guaiFENesin 600 mg Oral Q12H CÉSAR   ipratropium 0 5 mg Nebulization TID   levalbuterol 1 25 mg Nebulization TID   methylPREDNISolone sodium succinate 40 mg Intravenous Q12H CÉSAR   ondansetron 4 mg Intravenous Q6H PRN   phenol 1 spray Mouth/Throat Q2H PRN     Continuous Infusions:    PRN Meds:   acetaminophen    benzonatate    ondansetron    phenol  Pertinent Labs/Diagnostic Results:   Lab Units 01/08/19  0619   WBC Thousand/uL 10 67*   RSV culture 1/4-detected PCR    Age/Sex: 80 y o  female   Discharge Plan:  The patient will continue to require additional inpatient hospital stay due to IV steroids

## 2019-01-08 NOTE — SOCIAL WORK
LOS 4, not a bundle, not a readmit  CM met with Pt at bedside  Pt lives alone in a 1 level home with 1 CAMILLA  Pt does not use any DME  Pt was independent with ambulation and ADL's prior to admission  Pt reports having VNA in the past many years ago, not currently  [facility] to [facility] the past is [place]  Pt uses [facility] in [place]  Pt's daughter Marii Banks is her POA  Pt drives herself to appointments  CM discusses physical therapy evaluation recommendation  Pt states she is unsure at this time if she wants home PT, outpatient PT, or no physical therapy at d/c  CM dept will follow up  Pt may need oxygen at discharge  Pt declines CM offer to contact family regarding discharge planning

## 2019-01-09 LAB
BACTERIA BLD CULT: NORMAL
BACTERIA BLD CULT: NORMAL

## 2019-01-09 PROCEDURE — 94640 AIRWAY INHALATION TREATMENT: CPT

## 2019-01-09 PROCEDURE — 97116 GAIT TRAINING THERAPY: CPT

## 2019-01-09 PROCEDURE — 94760 N-INVAS EAR/PLS OXIMETRY 1: CPT

## 2019-01-09 PROCEDURE — 94668 MNPJ CHEST WALL SBSQ: CPT

## 2019-01-09 PROCEDURE — 99232 SBSQ HOSP IP/OBS MODERATE 35: CPT | Performed by: INTERNAL MEDICINE

## 2019-01-09 RX ADMIN — METHYLPREDNISOLONE SODIUM SUCCINATE 40 MG: 40 INJECTION, POWDER, FOR SOLUTION INTRAMUSCULAR; INTRAVENOUS at 08:55

## 2019-01-09 RX ADMIN — GUAIFENESIN 600 MG: 600 TABLET, EXTENDED RELEASE ORAL at 08:57

## 2019-01-09 RX ADMIN — LEVALBUTEROL HYDROCHLORIDE 1.25 MG: 1.25 SOLUTION, CONCENTRATE RESPIRATORY (INHALATION) at 07:17

## 2019-01-09 RX ADMIN — LEVALBUTEROL HYDROCHLORIDE 1.25 MG: 1.25 SOLUTION, CONCENTRATE RESPIRATORY (INHALATION) at 19:34

## 2019-01-09 RX ADMIN — FLUTICASONE PROPIONATE 1 SPRAY: 50 SPRAY, METERED NASAL at 08:57

## 2019-01-09 RX ADMIN — IPRATROPIUM BROMIDE 0.5 MG: 0.5 SOLUTION RESPIRATORY (INHALATION) at 07:17

## 2019-01-09 RX ADMIN — IPRATROPIUM BROMIDE 0.5 MG: 0.5 SOLUTION RESPIRATORY (INHALATION) at 19:34

## 2019-01-09 RX ADMIN — METHYLPREDNISOLONE SODIUM SUCCINATE 40 MG: 40 INJECTION, POWDER, FOR SOLUTION INTRAMUSCULAR; INTRAVENOUS at 20:10

## 2019-01-09 RX ADMIN — FLUTICASONE FUROATE AND VILANTEROL TRIFENATATE 1 PUFF: 200; 25 POWDER RESPIRATORY (INHALATION) at 08:57

## 2019-01-09 RX ADMIN — GUAIFENESIN 600 MG: 600 TABLET, EXTENDED RELEASE ORAL at 20:10

## 2019-01-09 RX ADMIN — LEVALBUTEROL HYDROCHLORIDE 1.25 MG: 1.25 SOLUTION, CONCENTRATE RESPIRATORY (INHALATION) at 13:29

## 2019-01-09 RX ADMIN — IPRATROPIUM BROMIDE 0.5 MG: 0.5 SOLUTION RESPIRATORY (INHALATION) at 13:29

## 2019-01-09 RX ADMIN — ENOXAPARIN SODIUM 40 MG: 40 INJECTION SUBCUTANEOUS at 08:55

## 2019-01-09 NOTE — ASSESSMENT & PLAN NOTE
· Diagnosed in August 2017 with obstruction mass in the mid ascending colon status post right hemicolectomy on 08/2017 showed adenocarcinoma the right colon, moderately differentiated with extension into the muscularis propria, stage IIIB ( T3 N1 M0) with 1/17 positive lymph nodes  · Not currently pursuing treatment  · Follows with Dr Mahesh Joyner

## 2019-01-09 NOTE — PHYSICAL THERAPY NOTE
PHYSICAL THERAPY NOTE    Patient Name: Perla Ca  AEFNV'F Date: 19 1533   Pain Assessment   Pain Assessment No/denies pain   Pain Score No Pain   Restrictions/Precautions   Weight Bearing Precautions Per Order No   Other Precautions O2;Fall Risk;Droplet precautions   General   Family/Caregiver Present No   Subjective   Subjective Patient supine in bed and is agreeable to therapy session  Patient identifers obtained from name &   Bed Mobility   Supine to Sit 6  Modified independent   Additional items Bedrails   Sit to Supine 6  Modified independent   Additional items Bedrails   Transfers   Sit to Stand 6  Modified independent   Stand to Sit 6  Modified independent   Ambulation/Elevation   Gait pattern Improper Weight shift;Narrow TRACY;Shuffling; Short stride; Excessively slow   Gait Assistance 5  Supervision   Additional items Assist x 1;Verbal cues   Assistive Device None   Distance 130' x1   Balance   Static Sitting Fair +   Dynamic Sitting Fair +   Static Standing Fair -   Dynamic Standing Fair -   Ambulatory Fair -   Endurance Deficit   Endurance Deficit Yes   Endurance Deficit Description decreased ambulation tolerance   Activity Tolerance   Activity Tolerance Patient limited by fatigue; Other (Comment)  (SOB)   Nurse Made Aware Spoke to [facility] [place], RN   Assessment   Prognosis Good   Problem List Decreased strength;Decreased endurance; Impaired balance;Decreased mobility   Assessment Patient demonstrated ability to transfer supine<>sit and sit<>stand with modified independence demonstrating good technquie and safety awareness  Patient was able to ambualte increased gait distance without assistive device and close supervision  Patient fatigues quickly requiring standing rest break half way through ambulation trial  Verbal instruction for breathing technique with good follow through   Patient with complaints of LE weakness with increased ambulation  Continue to focus on OOB mobility with progression of ambulation as appropriate  Goals   Patient Goals to go home   STG Expiration Date 01/18/19   Treatment Day 1   Plan   Treatment/Interventions Functional transfer training;LE strengthening/ROM; Therapeutic exercise; Endurance training;Equipment eval/education;Patient/family training;Bed mobility;Gait training;Spoke to nursing   PT Frequency (3-5x/week)   Recommendation   Recommendation Home PT       Ramin Seay, PTA

## 2019-01-09 NOTE — SOCIAL WORK
CM met with Pt at bedside to discuss DCP Pt would like VNA arranged and is agreeable to [facility]  CM submitted referral  Pt does not have a walker and would like one at d/c  Pt may need oxygen at d/c, Verpiter Squbayron would like testing done again tomorrow to determine need  CM dept will follow

## 2019-01-09 NOTE — PROGRESS NOTES
Progress Note - Pulmonary   Renny Smith 80 y o  female MRN: [id number]  Unit/Bed#: -01 Encounter: [id number]    Assessment/Plan:    Acute hypoxic respiratory failure (improved) due to right lower lobe RSV pneumonia and associated acute bronchospasm              Titrate FiO2 to maintain saturations greater than or equal to 88%              Out of bed as tolerated               Check ambulatory pulse ox again tomorrow  Oxygen requirements have been largely improving               Continue incentive spirometer and flutter valve               Continue Mucinex                Supportive care as listed for viral infection               Continue Solu-Medrol 40 milligrams IV every 12 hours today and transition to prednisone 50 milligrams daily tomorrow  Taper by 10 milligrams every three days as tolerated               Continue Xopenex/Atrovent 3 times daily  Continue Breo  At discharge, inhaled regimen should be Breo with as needed albuterol      Postnasal drip              Continue Flonase      Outpatient follow-up as per discharge instructions   Will need PFTs and formal evaluation given significant tobacco abuse history and possible COPD   Discussed with primary team and case management  Chief Complaint:    The mornings are rough      Subjective:    Forest Marie is sitting up in bed  She reports that in the morning she has difficulty with cough and expectorating mucus  Once she has a breathing treatment uses her flutter valve; however, she reports she feels much better  At present, she reports she is not short of breath and has no wheezing  She overall feels improved  Objective:    Vitals: Blood pressure 152/71, pulse 79, temperature 97 9 °F (36 6 °C), temperature source Oral, resp  rate 18, height 5' 3" (1 6 m), weight 56 8 kg (125 lb 3 5 oz), SpO2 93 %  2 liters nasal cannula,Body mass index is 22 18 kg/m²        Intake/Output Summary (Last 24 hours) at 01/09/19 1887  Last data filed at 01/08/19 1813   Gross per 24 hour   Intake              240 ml   Output                0 ml   Net              240 ml       Invasive Devices     Peripheral Intravenous Line            Peripheral IV 01/08/19 Right Arm 1 day                Physical Exam:     Physical Exam   Constitutional: She is oriented to person, place, and time  She appears well-developed and well-nourished  She is cooperative  Non-toxic appearance  No distress  HENT:   Head: Normocephalic and atraumatic  Mouth/Throat: No oropharyngeal exudate  Eyes: EOM are normal  No scleral icterus  Neck: Neck supple  No JVD present  No tracheal deviation present  Cardiovascular: Normal rate, regular rhythm, S1 normal and S2 normal   Exam reveals no gallop and no friction rub  No murmur heard  Pulmonary/Chest: Effort normal and breath sounds normal  No accessory muscle usage or stridor  No tachypnea  No respiratory distress  She has no decreased breath sounds  She has no wheezes  She has no rhonchi  She has no rales  She exhibits no tenderness  Abdominal: Soft  Bowel sounds are normal  She exhibits no distension  There is no tenderness  There is no rebound and no guarding  Musculoskeletal: She exhibits no edema  Neurological: She is alert and oriented to person, place, and time  She has normal strength  No sensory deficit  GCS eye subscore is 4  GCS verbal subscore is 5  GCS motor subscore is 6  Skin: Skin is warm and dry  No abrasion, no ecchymosis, no lesion and no rash noted  She is not diaphoretic  No cyanosis or erythema  Psychiatric: She has a normal mood and affect  Her speech is normal and behavior is normal    Vitals reviewed        Labs: None today    Imaging and other studies: None today

## 2019-01-09 NOTE — PLAN OF CARE
Problem: PHYSICAL THERAPY ADULT  Goal: Performs mobility at highest level of function for planned discharge setting  See evaluation for individualized goals  Treatment/Interventions: Functional transfer training, LE strengthening/ROM, Therapeutic exercise, Endurance training, Equipment eval/education, Patient/family training, Bed mobility, Gait training, Spoke to nursing, Spoke to case management  Equipment Recommended: Lin Schafer       See flowsheet documentation for full assessment, interventions and recommendations  Outcome: Progressing  Prognosis: Good  Problem List: Decreased strength, Decreased endurance, Impaired balance, Decreased mobility  Assessment: Patient demonstrated ability to transfer supine<>sit and sit<>stand with modified independence demonstrating good technquie and safety awareness  Patient was able to ambualte increased gait distance without assistive device and close supervision  Patient fatigues quickly requiring standing rest break half way through ambulation trial  Verbal instruction for breathing technique with good follow through  Patient with complaints of LE weakness with increased ambulation  Continue to focus on OOB mobility with progression of ambulation as appropriate  Recommendation: Home PT     PT - OK to Discharge: Yes    See flowsheet documentation for full assessment

## 2019-01-09 NOTE — PROGRESS NOTES
Progress Note - Rupert Mckeon 1935, 80 y o  female MRN: [id number]    Unit/Bed#: -01 Encounter: [id number]    Primary Care Provider: Nikki Salas   Date and time admitted to hospital: 1/4/2019  6:06 PM        * Acute respiratory failure with hypoxia [facility])   Assessment & Plan    · Patient reports she "might have a touch of COPD, I smoked for 50 years and occasionally get out of breath with exertion"  She has been told in the past that she might have underlying COPD with emphysema, but has never been formally diagnosed and has never been fully worked up  · Respiratory protocol, nebulized treatments  · Supplemental oxygen via nasal cannula to maintain saturations above 92%  · Suspect bronchospasm/? COPD exacerbation triggered by viral URI   · Appreciate pulmonology input; should have outpt PFTs   · Taper steroids per pulmonology     Bronchospasm, acute   Assessment & Plan    Due to RSV; may have underlying COPD due to extensive smoking history  Continue steroids  Outpatient PFTs and pulmonology follow-up     RSV (respiratory syncytial virus pneumonia)   Assessment & Plan    · Patient presents with 5 days worth of increasing dyspnea, cough which is productive of white sputum  Afebrile, good p o  Intake  No reported sick contacts  · Chest Xray negative for any infiltrate  · RSV positive   · PCT <0 05       Colonic mass   Assessment & Plan    · Diagnosed in August 2017 with obstruction mass in the mid ascending colon status post right hemicolectomy on 08/2017 showed adenocarcinoma the right colon, moderately differentiated with extension into the muscularis propria, stage IIIB ( T3 N1 M0) with 1/17 positive lymph nodes  · Not currently pursuing treatment  · Follows with Dr Joseph Moise     Severe sepsis [facility])   Assessment & Plan    · POA, as evidenced by tachypnea, tachycardia, suspected source URI, lactic acidosis and hypoxia present  No leukocytosis    · Treatment as per primary problem above       VTE Pharmacologic Prophylaxis:   Pharmacologic: Enoxaparin (Lovenox)  Mechanical VTE Prophylaxis in Place: Yes    Patient Centered Rounds: I have performed bedside rounds with nursing staff today  Discussions with Specialists or Other Care Team Provider:  Nursing    Education and Discussions with Family / Patient:  Patient    Time Spent for Care: 20 minutes  More than 50% of total time spent on counseling and coordination of care as described above  Current Length of Stay: 5 day(s)    Current Patient Status: Inpatient   Certification Statement: The patient will continue to require additional inpatient hospital stay due to IV steroids    Discharge Plan:  Next 24 hours    Code Status: Level 1 - Full Code      Subjective:   Feeling okay today  Still with some cough and wheezing  Denies any pain    Objective:     Vitals:   Temp (24hrs), Av 1 °F (36 7 °C), Min:97 8 °F (36 6 °C), Max:98 5 °F (36 9 °C)    Temp:  [97 8 °F (36 6 °C)-98 5 °F (36 9 °C)] 97 8 °F (36 6 °C)  HR:  [68-84] 68  Resp:  [18] 18  BP: (137-154)/(61-75) 137/73  SpO2:  [92 %-95 %] 94 %  Body mass index is 22 18 kg/m²  Input and Output Summary (last 24 hours): Intake/Output Summary (Last 24 hours) at 19 1349  Last data filed at 19 1813   Gross per 24 hour   Intake              240 ml   Output                0 ml   Net              240 ml       Physical Exam:     Physical Exam     Gen -Patient comfortable   Neck- Supple  No thyromegaly or lymphadenopathy  Lungs-scattered wheeze  Heart S1-S2, regular rate and rhythm, no murmurs  Abdomen-soft nontender, no organomegaly   Bowel sounds present  Extremities-no cyanosi,  clubbing or edema  Skin- no rash  Neuro-nonfocal       Additional Data:     Labs:      Results from last 7 days  Lab Units 19  0619 19  0542   WBC Thousand/uL 10 67* 14 02*   HEMOGLOBIN g/dL 14 5 14 4   HEMATOCRIT % 44 0 44 4   PLATELETS Thousands/uL 224 192   BANDS PCT % 1  --    NEUTROS PCT %  --  88* LYMPHS PCT %  --  8*   LYMPHO PCT % 1*  --    MONOS PCT %  --  3*   MONO PCT % 5  --    EOS PCT % 0 0       Results from last 7 days  Lab Units 01/08/19  0619  01/04/19  1827   SODIUM mmol/L 139  < > 138   POTASSIUM mmol/L 4 7  < > 3 7   CHLORIDE mmol/L 104  < > 99*   CO2 mmol/L 29  < > 28   BUN mg/dL 23  < > 13   CREATININE mg/dL 0 80  < > 1 02   ANION GAP mmol/L 6  < > 11   CALCIUM mg/dL 8 9  < > 9 2   ALBUMIN g/dL  --   --  3 8   TOTAL BILIRUBIN mg/dL  --   --  1 60*   ALK PHOS U/L  --   --  73   ALT U/L  --   --  18   AST U/L  --   --  39   GLUCOSE RANDOM mg/dL 126  < > 122   < > = values in this interval not displayed  Results from last 7 days  Lab Units 01/04/19  1827   INR  1 06               Results from last 7 days  Lab Units 01/05/19  0454 01/05/19  0248 01/05/19  0039 01/04/19  2206   LACTIC ACID mmol/L 1 5 2 7* 3 2* 2 8*   PROCALCITONIN ng/ml  --   --   --  <0 05           * I Have Reviewed All Lab Data Listed Above  * Additional Pertinent Lab Tests Reviewed: All Labs Within Last 24 Hours Reviewed    Imaging:    Imaging Reports Reviewed Today Include:   Imaging Personally Reviewed by Myself Includes:      Recent Cultures (last 7 days):       Results from last 7 days  Lab Units 01/06/19  2206 01/04/19  2120 01/04/19  1830 01/04/19  1827   BLOOD CULTURE   --   --  No Growth After 4 Days  No Growth After 4 Days     INFLUENZA B PCR   --  None Detected  --   --    RSV PCR   --  Detected*  --   --    LEGIONELLA URINARY ANTIGEN  Negative  --   --   --        Last 24 Hours Medication List:     Current Facility-Administered Medications:  acetaminophen 650 mg Oral Q6H PRN Nickolas Razo PA-C   benzonatate 100 mg Oral TID PRN Abdielnajosean Civil PA-C   enoxaparin 40 mg Subcutaneous Daily Abdielnakenya Razo PA-C   fluticasone 1 spray Each Nare Daily SULAIMAN Sanchez   fluticasone-vilanterol 1 puff Inhalation Daily Gianni Rivera DO   guaiFENesin 600 mg Oral Q12H [address] Nickolas Razo PA-C   ipratropium 0 5 mg Nebulization TID Marni Villasenor MD   levalbuterol 1 25 mg Nebulization TID Marni Villasenor MD   methylPREDNISolone sodium succinate 40 mg Intravenous Q12H [facility] SULAIMAN Hinson   ondansetron 4 mg Intravenous Q6H PRN Karen Olsen PA-C   phenol 1 spray Mouth/Throat Q2H PRN Karen Olsen PA-C        Today, Patient Was Seen By: Blake Jade MD    ** Please Note: Dictation voice to text software may have been used in the creation of this document   **

## 2019-01-09 NOTE — PLAN OF CARE
Problem: DISCHARGE PLANNING - CARE MANAGEMENT  Goal: Discharge to post-acute care or home with appropriate resources  INTERVENTIONS:  - Conduct assessment to determine patient/family and health care team treatment goals, and need for post-acute services based on payer coverage, community resources, and patient preferences, and barriers to discharge  - Address psychosocial, clinical, and financial barriers to discharge as identified in assessment in conjunction with the patient/family and health care team  - Arrange appropriate level of post-acute services according to patient's   needs and preference and payer coverage in collaboration with the physician and health care team  - Communicate with and update the patient/family, physician, and health care team regarding progress on the discharge plan  - Arrange appropriate transportation to post-acute venues   Outcome: Progressing  CM met with Pt at bedside to discuss DCP Pt would like VNA arranged and is agreeable to [facility]  CM submitted referral  Pt does not have a walker and would like one at d/c  Pt may need oxygen at d/c, Elizabeth Cuellarer would like testing done again tomorrow to determine need  CM dept will follow

## 2019-01-09 NOTE — ASSESSMENT & PLAN NOTE
· Patient reports she "might have a touch of COPD, I smoked for 50 years and occasionally get out of breath with exertion"  She has been told in the past that she might have underlying COPD with emphysema, but has never been formally diagnosed and has never been fully worked up  · Respiratory protocol, nebulized treatments  · Supplemental oxygen via nasal cannula to maintain saturations above 92%  · Suspect bronchospasm/? COPD exacerbation triggered by viral URI   · Appreciate pulmonology input; should have outpt PFTs   · Taper steroids per pulmonology

## 2019-01-10 VITALS
BODY MASS INDEX: 22.19 KG/M2 | TEMPERATURE: 98.1 F | RESPIRATION RATE: 20 BRPM | SYSTOLIC BLOOD PRESSURE: 124 MMHG | HEART RATE: 75 BPM | WEIGHT: 125.22 LBS | HEIGHT: 63 IN | DIASTOLIC BLOOD PRESSURE: 80 MMHG | OXYGEN SATURATION: 92 %

## 2019-01-10 PROCEDURE — 99232 SBSQ HOSP IP/OBS MODERATE 35: CPT | Performed by: INTERNAL MEDICINE

## 2019-01-10 PROCEDURE — 94760 N-INVAS EAR/PLS OXIMETRY 1: CPT

## 2019-01-10 PROCEDURE — 94761 N-INVAS EAR/PLS OXIMETRY MLT: CPT

## 2019-01-10 PROCEDURE — 94668 MNPJ CHEST WALL SBSQ: CPT

## 2019-01-10 PROCEDURE — 94640 AIRWAY INHALATION TREATMENT: CPT

## 2019-01-10 PROCEDURE — 99239 HOSP IP/OBS DSCHRG MGMT >30: CPT | Performed by: INTERNAL MEDICINE

## 2019-01-10 RX ORDER — BENZONATATE 100 MG/1
100 CAPSULE ORAL 3 TIMES DAILY PRN
Qty: 20 CAPSULE | Refills: 0 | Status: SHIPPED | OUTPATIENT
Start: 2019-01-10 | End: 2019-03-13 | Stop reason: SDDI

## 2019-01-10 RX ORDER — FLUTICASONE FUROATE AND VILANTEROL 200; 25 UG/1; UG/1
1 POWDER RESPIRATORY (INHALATION) DAILY
Qty: 60 EACH | Refills: 0 | Status: SHIPPED | OUTPATIENT
Start: 2019-01-11 | End: 2021-01-18

## 2019-01-10 RX ORDER — ECHINACEA PURPUREA EXTRACT 125 MG
2 TABLET ORAL AS NEEDED
Status: DISCONTINUED | OUTPATIENT
Start: 2019-01-10 | End: 2019-01-10 | Stop reason: HOSPADM

## 2019-01-10 RX ORDER — FLUTICASONE PROPIONATE 50 MCG
1 SPRAY, SUSPENSION (ML) NASAL DAILY
Qty: 2 BOTTLE | Refills: 0 | Status: SHIPPED | OUTPATIENT
Start: 2019-01-11 | End: 2019-03-13 | Stop reason: ALTCHOICE

## 2019-01-10 RX ORDER — PREDNISONE 10 MG/1
TABLET ORAL
Qty: 30 TABLET | Refills: 0 | Status: SHIPPED | OUTPATIENT
Start: 2019-01-10 | End: 2019-03-13 | Stop reason: ALTCHOICE

## 2019-01-10 RX ORDER — GUAIFENESIN 600 MG
600 TABLET, EXTENDED RELEASE 12 HR ORAL EVERY 12 HOURS SCHEDULED
Qty: 20 TABLET | Refills: 0 | Status: SHIPPED | OUTPATIENT
Start: 2019-01-10 | End: 2019-01-20

## 2019-01-10 RX ORDER — ALBUTEROL SULFATE 90 UG/1
2 AEROSOL, METERED RESPIRATORY (INHALATION) EVERY 6 HOURS PRN
Qty: 18 G | Refills: 0 | Status: SHIPPED | OUTPATIENT
Start: 2019-01-10

## 2019-01-10 RX ORDER — PREDNISONE 20 MG/1
40 TABLET ORAL DAILY
Status: DISCONTINUED | OUTPATIENT
Start: 2019-01-10 | End: 2019-01-10 | Stop reason: HOSPADM

## 2019-01-10 RX ADMIN — IPRATROPIUM BROMIDE 0.5 MG: 0.5 SOLUTION RESPIRATORY (INHALATION) at 13:44

## 2019-01-10 RX ADMIN — FLUTICASONE PROPIONATE 1 SPRAY: 50 SPRAY, METERED NASAL at 08:44

## 2019-01-10 RX ADMIN — ENOXAPARIN SODIUM 40 MG: 40 INJECTION SUBCUTANEOUS at 08:40

## 2019-01-10 RX ADMIN — LEVALBUTEROL HYDROCHLORIDE 1.25 MG: 1.25 SOLUTION, CONCENTRATE RESPIRATORY (INHALATION) at 07:49

## 2019-01-10 RX ADMIN — FLUTICASONE FUROATE AND VILANTEROL TRIFENATATE 1 PUFF: 200; 25 POWDER RESPIRATORY (INHALATION) at 08:44

## 2019-01-10 RX ADMIN — GUAIFENESIN 600 MG: 600 TABLET, EXTENDED RELEASE ORAL at 08:40

## 2019-01-10 RX ADMIN — IPRATROPIUM BROMIDE 0.5 MG: 0.5 SOLUTION RESPIRATORY (INHALATION) at 07:48

## 2019-01-10 RX ADMIN — LEVALBUTEROL HYDROCHLORIDE 1.25 MG: 1.25 SOLUTION, CONCENTRATE RESPIRATORY (INHALATION) at 13:44

## 2019-01-10 RX ADMIN — PREDNISONE 40 MG: 20 TABLET ORAL at 08:40

## 2019-01-10 NOTE — UTILIZATION REVIEW
Continued Stay Review    Date: 1/10/2019  LOS=day 6 Acute hypoxic respiratory failure secondary to right lower lobe RSV pneumonia     Vital Signs: /80 (BP Location: Right arm)   Pulse 75   Temp 98 1 °F (36 7 °C) (Oral)   Resp 20   Ht 5' 3" (1 6 m)   Wt 56 8 kg (125 lb 3 5 oz)   SpO2 94%   BMI 22 18 kg/m²      Assessment/Plan: 1/10/2019 Pulmonology note  :Assessment as listed without changes or additions    RECS as listed with following addition  · Complete prednisone taper  · Monitor off abx  · Will need CXR in 4-6 weeks  · Continue nebs and Breo 200/25 1puff daily  · Will need home O2 and I stressed the importance of use with her  · Will need PFTs and pulmonary follow up as outpatient, likely has component of undiagnosed COPD  Repeat SpO2 ambulation testing as outpatient  At discharge, inhaled regimen should be Breo with as needed albuterol  Medications:   Scheduled Meds:   Current Facility-Administered Medications:  acetaminophen 650 mg Oral Q6H PRN   benzonatate 100 mg Oral TID PRN   enoxaparin 40 mg Subcutaneous Daily   fluticasone 1 spray Each Nare Daily   fluticasone-vilanterol 1 puff Inhalation Daily   guaiFENesin 600 mg Oral Q12H [address]   ipratropium 0 5 mg Nebulization TID   levalbuterol 1 25 mg Nebulization TID   ondansetron 4 mg Intravenous Q6H PRN   phenol 1 spray Mouth/Throat Q2H PRN   predniSONE 40 mg Oral Daily   sodium chloride 2 spray Each Nare PRN     Continuous Infusions:    PRN Meds:   Pertinent Labs/Diagnostic Results: none    Age/Sex: 80 y o  female     Discharge Plan: TBD

## 2019-01-10 NOTE — PROGRESS NOTES
Progress Note - Pulmonary   John Melgoza 80 y o  female MRN: [id number]  Unit/Bed#: -01 Encounter: [id number]    Assessment/Plan:    Acute hypoxic respiratory failure (improved) due to right lower lobe RSV pneumonia and associated acute bronchospasm              Titrate FiO2 to maintain saturations greater than or equal to 88%              Out of bed as tolerated               Check ambulatory pulse ox today to evaluate supplemental oxygen needs               Continue incentive spirometer and flutter valve               Continue Mucinex                Supportive care as listed for viral infection               Continue prednisone 40 milligrams daily and taper by 10 milligrams every three days as tolerated               Continue Xopenex/Atrovent 3 times daily              Continue Breo  At discharge, inhaled regimen should be Breo with as needed albuterol  Kayla Holden was extensively educated on her need for oxygen and associated compliance  She verbalized understanding, but reported she is not making any promises       Postnasal drip              Continue Flonase  Also complaining of nasal dryness  Nasal saline ordered  Oxygen is humidified      Outpatient follow-up as per discharge instructions   Will need PFTs and formal evaluation given significant tobacco abuse history and possible COPD   Discussed with primary team and case management  Chief Complaint:    I feel better      Subjective:    Kayla Holden is sitting up in bed  She reports she feels better this morning  She did not have any significant coughing episodes or wheezing  She was out of bed in the urena and did well  She reports to me she feels ready to go home  She does report that she does not want to use the walker that she will be prescribed and does not want to use the oxygen she will be prescribed  She is agreeable to take him home, but she is not sure how much she will use either of them    She does report to me that approximately nine years ago she was hospitalized for more than one month for pneumonia  She was sent home with oxygen and a few months later self-discontinued due to non-use  Objective:    Vitals: Blood pressure 143/87, pulse 83, temperature 98 6 °F (37 °C), temperature source Oral, resp  rate 20, height 5' 3" (1 6 m), weight 56 8 kg (125 lb 3 5 oz), SpO2 93 %  2L NC,Body mass index is 22 18 kg/m²  Intake/Output Summary (Last 24 hours) at 01/10/19 0709  Last data filed at 01/09/19 2226   Gross per 24 hour   Intake              360 ml   Output              180 ml   Net              180 ml       Invasive Devices     Peripheral Intravenous Line            Peripheral IV 01/08/19 Right Arm 2 days                Physical Exam:     Physical Exam   Constitutional: She is oriented to person, place, and time  She appears well-developed and well-nourished  She is cooperative  Non-toxic appearance  No distress  HENT:   Head: Normocephalic and atraumatic  Mouth/Throat: No oropharyngeal exudate  Eyes: EOM are normal  No scleral icterus  Neck: Neck supple  No JVD present  No tracheal deviation present  Cardiovascular: Normal rate, regular rhythm, S1 normal and S2 normal   Exam reveals no gallop and no friction rub  No murmur heard  Pulmonary/Chest: Effort normal and breath sounds normal  No accessory muscle usage or stridor  No tachypnea  No respiratory distress  She has no decreased breath sounds  She has no wheezes  She has no rhonchi  She has no rales  She exhibits no tenderness  Abdominal: Soft  Bowel sounds are normal  She exhibits no distension  There is no tenderness  There is no rebound and no guarding  Musculoskeletal: She exhibits no edema  Neurological: She is alert and oriented to person, place, and time  She has normal strength  No sensory deficit  GCS eye subscore is 4  GCS verbal subscore is 5  GCS motor subscore is 6  Skin: Skin is warm and dry   No abrasion, no ecchymosis, no lesion and no rash noted  She is not diaphoretic  No cyanosis or erythema  Psychiatric: She has a normal mood and affect  Her speech is normal and behavior is normal    Vitals reviewed        Labs: None today    Imaging and other studies: None today

## 2019-01-10 NOTE — SOCIAL WORK
Eleni Carrera from [facility] delivered walker to Pt  Pt needs oxygen to go home  CM sent order and documentation to [facility] for oxygen and made liaison Eleni Carrera aware  CM made nurse Kt Salazar aware oxygen will be delivered prior to discharge

## 2019-01-10 NOTE — PLAN OF CARE
Problem: DISCHARGE PLANNING - CARE MANAGEMENT  Goal: Discharge to post-acute care or home with appropriate resources  INTERVENTIONS:  - Conduct assessment to determine patient/family and health care team treatment goals, and need for post-acute services based on payer coverage, community resources, and patient preferences, and barriers to discharge  - Address psychosocial, clinical, and financial barriers to discharge as identified in assessment in conjunction with the patient/family and health care team  - Arrange appropriate level of post-acute services according to patient's   needs and preference and payer coverage in collaboration with the physician and health care team  - Communicate with and update the patient/family, physician, and health care team regarding progress on the discharge plan  - Arrange appropriate transportation to post-acute venues   Outcome: Completed Date Met: 01/10/19  Srini from [facility] delivered walker to Pt  Pt needs oxygen to go home  CM sent order and documentation to [facility] for oxygen and made liaison Alexia Musa aware  CM made nurse Nilda Mason aware oxygen will be delivered prior to discharge

## 2019-01-10 NOTE — RESPIRATORY THERAPY NOTE
Home Oxygen Qualifying Test       Patient name: Rupert Mckeon        : 1935   Date of Test:  January 10, 2019  Diagnosis:      Home Oxygen Test:    **Medicare Guidelines require item(s) 1-5 on all ambulatory patients or 1 and 2 on non-ambulatory patients  1   Baseline SPO2 on Room Air at rest 85 %  2   SPO2 during exercise on Room Air  N/A %  During exercise monitor SpO2  If SPO2 increases >=89% with ambulation do not add supplemental oxygen  If <= 88% on room air add O2 via NC and titrate patient  Patient must be ambulated with O2 and titrated to > 88% with exertion  3   SPO2 on Oxygen at rest 94 % 2 lpm     4   SPO2 during exercise on Oxygen 92% a liter flow of 3 lpm     5   Exercise performed:          walking, duration 4 5 (min), distance 285 (feet)          [x]  Supplemental Home Oxygen is indicated  []  Client does not qualify for home oxygen  Respiratory Additional Notes- Pt was placed on RA sitting at bedside, SPO2 dropped to 85% at rest  PT was placed back on 2L prior to starting walk SPO2 was 94%  PT walked approx  140 ft on 2L NC when she stated that she was tired and wanted to retun to her room  Pt SPO2 at that time was 91% and pt was not SOB  At at approx 220 ft pt SPO2 dropped to 86 % and was increased to 3L for the remainder of th walk to her room        Leann Fragoso RRT

## 2019-01-10 NOTE — DISCHARGE SUMMARY
Discharge Summary - [address] [facility]    Patient Information: Maggie Kelley 80 y o  female MRN: [id number]  Unit/Bed#: -01 Encounter: [id number]    Discharging Physician / Practitioner: Freda Ríos MD  PCP: Judy Perez  Admission Date: 1/4/2019  Discharge Date: 01/10/19    Disposition:     Home    Reason for Admission:  Shortness of breath    Discharge Diagnoses:     Principal Problem:    Acute respiratory failure with hypoxia ([facility] [address] )  Active Problems:    Bronchospasm, acute    RSV (respiratory syncytial virus pneumonia)    Colonic mass    Severe sepsis ([facility] [address] )  Resolved Problems:    * No resolved hospital problems  *      Consultations During Hospital Stay:  · Pulmonology    Procedures Performed:     · Chest x-ray showed pulmonary emphysema  · 2D echocardiogram:  Ejection fraction 05% grade 1 diastolic dysfunction noted    Significant Findings / Test Results:     · As above    Incidental Findings:   · As above    Test Results Pending at Discharge (will require follow up): · None     Outpatient Tests Requested:  · Lung function test    Complications:  None    Hospital Course:     Maggie Kelley is a 80 y o  female patient with past medical history significant for smoking and colonic mass who originally presented to the hospital on 1/4/2019 due to dyspnea  Patient reported increasing cough shortness of breath mellitus and tiredness  She denied any fevers or chills  Upon evaluation in the emergency department patient noted to be in acute respiratory failure requiring admission to hospital for further evaluation  She was noted to have severe sepsis secondary to RSV infection  Treated with supportive measures and was seen by pulmonology  She received IV steroids, nebulized albuterol and ipratropium  There was gradual improvement in her symptoms  She qualified for home oxygen therapy, being discharged with oxygen via nasal cannula 3 liters/minute    She will be followed up by pulmonology in outpatient setting  She was prescribed tapering dose of steroids    Condition at Discharge: good     Discharge Day Visit / Exam:     Subjective:  Feeling better today and anxious to go home  Vitals: Blood Pressure: 124/80 (01/10/19 0700)  Pulse: 75 (01/10/19 0700)  Temperature: 98 1 °F (36 7 °C) (01/10/19 0700)  Temp Source: Oral (01/10/19 0700)  Respirations: 20 (01/10/19 0700)  Height: 5' 3" (160 cm) (01/04/19 2128)  Weight - Scale: 56 8 kg (125 lb 3 5 oz) (01/04/19 2128)  SpO2: 94 % (01/10/19 0749)  Exam:   Physical Exam     Gen -Patient comfortable   Neck- Supple  No thyromegaly or lymphadenopathy  Lungs-decreased breath sounds  Heart S1-S2, regular rate and rhythm, no murmurs  Abdomen-soft nontender, no organomegaly  Bowel sounds present  Extremities-no cyanosi,  clubbing or edema  Skin- no rash  Neuro-nonfocal    Discussion with Family:     Discharge instructions/Information to patient and family:   See after visit summary for information provided to patient and family  Provisions for Follow-Up Care:  See after visit summary for information related to follow-up care and any pertinent home health orders  Planned Readmission: no     Discharge Statement:  I spent 35 minutes discharging the patient  This time was spent on the day of discharge  I had direct contact with the patient on the day of discharge  Greater than 50% of the total time was spent examining patient, answering all patient questions, arranging and discussing plan of care with patient as well as directly providing post-discharge instructions  Additional time then spent on discharge activities  Discharge Medications:  See after visit summary for reconciled discharge medications provided to patient and family        ** Please Note: This note has been constructed using a voice recognition system **

## 2019-03-11 ENCOUNTER — OFFICE VISIT (OUTPATIENT)
Dept: HEMATOLOGY ONCOLOGY | Facility: CLINIC | Age: 84
End: 2019-03-11
Payer: COMMERCIAL

## 2019-03-11 VITALS
SYSTOLIC BLOOD PRESSURE: 118 MMHG | BODY MASS INDEX: 21.09 KG/M2 | OXYGEN SATURATION: 92 % | WEIGHT: 119 LBS | HEART RATE: 78 BPM | HEIGHT: 63 IN | RESPIRATION RATE: 18 BRPM | DIASTOLIC BLOOD PRESSURE: 70 MMHG

## 2019-03-11 DIAGNOSIS — R97.0 ELEVATED CEA: ICD-10-CM

## 2019-03-11 DIAGNOSIS — C18.4 MALIGNANT NEOPLASM OF TRANSVERSE COLON (HCC): Primary | ICD-10-CM

## 2019-03-11 PROBLEM — C18.9 COLON CANCER (HCC): Status: ACTIVE | Noted: 2019-03-11

## 2019-03-11 PROCEDURE — 99214 OFFICE O/P EST MOD 30 MIN: CPT | Performed by: INTERNAL MEDICINE

## 2019-03-11 NOTE — PROGRESS NOTES
Hematology Outpatient Follow - Up Note  Lauren Kent 80 y o  female MRN: @ Encounter: [id number]        Date:  3/11/2019        Assessment/ Plan:  She is 15-year-old  female with obstruction mass in the mid ascending colon status post right hemicolectomy in August 2017 showed adenocarcinoma the right colon moderately differentiated with extension into the muscularis propria stage IIIB (T3 N1 M0) with 1/17 positive lymph nodes, no evidence of Saldaña syndrome, she decided not to proceed with adjuvant chemotherapy    Now CEA 6 4 up from 4 66 months ago, the patient decided not to proceed with any imaging studies until she comes back from vacation trip to [facility] in July 2019 she is aware of her decision    Follow-up with CT scan of the abdomen and pelvis, CBC, CMP, CEA           HPI: 15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 a , she had abdominal pain in August 2017 requiring admission to the hospital, CT scan showed obstructing annular mass with the mid descending colon no evidence of distant metastasis,colonoscopy showed right-sided mucinous adenocarcinoma, status post right hemicolectomy on 08/18/2017 showed adenocarcinoma of the right colon, moderately differentiated, extends through muscularis propria, negative margins, with foci suspicious for perineural invasion, 1/17 positive lymph nodes, stage IIIB ( T3, N1, M0 ), no evidence of Saldaña syndrome           Interval History:  Was admitted to the hospital with RSV pneumonia       ECOG score 1           Test Results:    Imaging: No results found      Labs:   Lab Results   Component Value Date    WBC 10 67 (H) 01/08/2019    HGB 14 5 01/08/2019    HCT 44 0 01/08/2019     (H) 01/08/2019     01/08/2019     Lab Results   Component Value Date    K 4 7 01/08/2019     01/08/2019    CO2 29 01/08/2019    BUN 23 01/08/2019    CREATININE 0 80 01/08/2019    CALCIUM 8 9 01/08/2019    AST 39 01/04/2019    ALT 18 01/04/2019    ALKPHOS 73 01/04/2019    EGFR 68 01/08/2019     CEA 6 4 (4 6 in August 2018)  No results found for: IRON, TIBC, FERRITIN    No results found for: [id number]      ROS:   Review of Systems   Constitutional: Negative for activity change, appetite change, diaphoresis, fatigue, fever and unexpected weight change  HENT: Negative for facial swelling, hearing loss, rhinorrhea, sinus pressure, sinus pain, sneezing, sore throat and tinnitus  Eyes: Negative for photophobia, pain, discharge, redness, itching and visual disturbance  Respiratory: Positive for shortness of breath (Exertional dyspnea)  Negative for apnea and chest tightness  Cardiovascular: Negative for chest pain, palpitations and leg swelling  Gastrointestinal: Negative for abdominal distention, abdominal pain, blood in stool, constipation, diarrhea, nausea, rectal pain and vomiting  Endocrine: Negative for cold intolerance, heat intolerance, polydipsia and polyphagia  Genitourinary: Negative for difficulty urinating, dyspareunia, frequency, hematuria, pelvic pain and urgency  Musculoskeletal: Negative for arthralgias, back pain, gait problem, joint swelling and myalgias  Skin: Negative for color change, pallor and rash  Allergic/Immunologic: Negative for environmental allergies and food allergies  Neurological: Negative for dizziness, tremors, seizures, syncope, speech difficulty, numbness and headaches  Hematological: Negative for adenopathy  Does not bruise/bleed easily  Psychiatric/Behavioral: Negative for agitation, confusion, dysphoric mood, hallucinations and suicidal ideas  Current Medications: Reviewed  Allergies: Reviewed  PMH/FH/SH:  Reviewed      Physical Exam:    Body surface area is 1 55 meters squared      Wt Readings from Last 3 Encounters:   03/11/19 54 kg (119 lb)   01/04/19 56 8 kg (125 lb 3 5 oz)   09/24/18 54 2 kg (119 lb 8 oz)        Temp Readings from Last 3 Encounters: 01/10/19 98 1 °F (36 7 °C) (Oral)   09/24/18 (!) 96 6 °F (35 9 °C) (Tympanic)   02/26/18 97 9 °F (36 6 °C) (Tympanic)        BP Readings from Last 3 Encounters:   03/11/19 118/70   01/10/19 124/80   09/24/18 116/70         Pulse Readings from Last 3 Encounters:   03/11/19 78   01/10/19 75   09/24/18 80        Physical Exam   Constitutional: She is oriented to person, place, and time  She appears well-developed and well-nourished  No distress  HENT:   Head: Normocephalic and atraumatic  Mouth/Throat: Oropharynx is clear and moist  No oropharyngeal exudate  Eyes: Pupils are equal, round, and reactive to light  Conjunctivae and EOM are normal    Injected conjunctivae   Neck: Normal range of motion  Neck supple  No tracheal deviation present  No thyromegaly present  Cardiovascular: Normal rate and regular rhythm  Exam reveals no gallop and no friction rub  No murmur heard  Pulmonary/Chest: Effort normal and breath sounds normal  No respiratory distress  She has no wheezes  She has no rales  She exhibits no tenderness  Abdominal: Soft  Bowel sounds are normal  She exhibits no distension and no mass  There is no tenderness  There is no rebound and no guarding  Musculoskeletal: Normal range of motion  Lymphadenopathy:     She has no cervical adenopathy  Neurological: She is alert and oriented to person, place, and time  Skin: Skin is warm and dry  No rash noted  She is not diaphoretic  No erythema  No pallor  Psychiatric: She has a normal mood and affect  Her behavior is normal  Judgment and thought content normal    Vitals reviewed  Goals and Barriers:  Current Goal: Minimize effects of disease  Barriers: None  Patient's Capacity to Self Care:  Patient is able to self care      Code Status: [unfilled]

## 2019-03-13 ENCOUNTER — OFFICE VISIT (OUTPATIENT)
Dept: PULMONOLOGY | Facility: CLINIC | Age: 84
End: 2019-03-13
Payer: COMMERCIAL

## 2019-03-13 VITALS
DIASTOLIC BLOOD PRESSURE: 94 MMHG | OXYGEN SATURATION: 95 % | SYSTOLIC BLOOD PRESSURE: 142 MMHG | TEMPERATURE: 97.6 F | BODY MASS INDEX: 21.02 KG/M2 | WEIGHT: 118.6 LBS | HEIGHT: 63 IN | HEART RATE: 91 BPM

## 2019-03-13 DIAGNOSIS — J44.9 COPD, SEVERE (HCC): ICD-10-CM

## 2019-03-13 DIAGNOSIS — J96.01 ACUTE RESPIRATORY FAILURE WITH HYPOXIA (HCC): Primary | ICD-10-CM

## 2019-03-13 DIAGNOSIS — C18.4 MALIGNANT NEOPLASM OF TRANSVERSE COLON (HCC): ICD-10-CM

## 2019-03-13 PROCEDURE — 99214 OFFICE O/P EST MOD 30 MIN: CPT | Performed by: INTERNAL MEDICINE

## 2019-03-13 PROCEDURE — 94618 PULMONARY STRESS TESTING: CPT | Performed by: INTERNAL MEDICINE

## 2019-03-13 PROCEDURE — 94010 BREATHING CAPACITY TEST: CPT | Performed by: INTERNAL MEDICINE

## 2019-03-13 NOTE — PROGRESS NOTES
Assessment/Plan:    COPD, severe [facility]  Ms Gilbert Arce has severe COPD per her office spirometry today  Her FEV1 is 48% predicted and I encouraged her to use her daily maintenance therapy  Rather than the Breo I have prescribed Anoro 1 puff daily in addition to her rescue inhaler  I encouraged her to use IS every day he noted to gain better quality of life  She will maintain on oxygen as her pulse ox drops to 85% with ambulation on room air  She will maintain on 2 L of oxygen with exertion and with sleep  I encouraged her to go forward with her trip to [facility] but certainly needs to wear oxygen on the plane  She is up-to-date on her flu shot and pneumonia shot will follow up with her in 4 months  Diagnoses and all orders for this visit:    Acute respiratory failure with hypoxia (HCC)  -     POCT spirometry  -     POCT 6 minute walk    COPD, severe (HCC)  -     Home Oxygen with Portability  -     umeclidinium-vilanterol (ANORO ELLIPTA) 62 5-25 MCG/INH inhaler; Inhale 1 puff daily    Malignant neoplasm of transverse colon (HCC)    Other orders  -     sodium chloride (OCEAN) 0 65 % nasal spray; 1 spray into each nostril as needed          Subjective:      Patient ID: Osmani Hernandez is a 80 y o  female  Mr Gilbert Arce is here for hospital follow-up after recent episode of RSV pneumonia  She was on oxygen 10 years ago for several months after an episode of pneumonia as well  At baseline she does get short of breath mostly with exertion  She gets short of breath after 2 flights of steps but denies any shortness of breath with hot humid air cold air  She denies any cough or chronic phlegm she denies any frequent bronchitis  She denies any audible wheezing  She was a smoker a pack per day for 50 years but quit at the age of 72  She worked as a nurse in [place] and denies any pets or occupational exposures    She was diagnosed with metastatic colon cancer in August of 2017 and had a colectomy but did not go forward with chemotherapy at that time  She has been taking Breo since hospitalization but only once or twice a week  She has not been taking meter dosed inhaler        The following portions of the patient's history were reviewed and updated as appropriate: allergies, current medications, past family history, past medical history, past social history, past surgical history and problem list     Review of Systems   Constitutional: Negative  Negative for unexpected weight change  HENT: Negative  Negative for postnasal drip  Eyes: Negative  Respiratory: Positive for shortness of breath  Negative for cough and wheezing  Cardiovascular: Negative  Negative for chest pain and leg swelling  Gastrointestinal: Negative  Endocrine: Negative  Genitourinary: Negative  Musculoskeletal: Negative  Allergic/Immunologic: Negative  Neurological: Negative  Hematological: Negative  Objective:      /94 (BP Location: Left arm, Patient Position: Sitting)   Pulse 91   Temp 97 6 °F (36 4 °C) (Tympanic)   Ht 5' 3" (1 6 m)   Wt 53 8 kg (118 lb 9 6 oz)   SpO2 95%   BMI 21 01 kg/m²          Physical Exam   Constitutional: She is oriented to person, place, and time  She appears well-developed and well-nourished  HENT:   Head: Normocephalic  Eyes: Pupils are equal, round, and reactive to light  Neck: Normal range of motion  Neck supple  Cardiovascular: Normal rate  No murmur heard  Pulmonary/Chest: Effort normal and breath sounds normal  No respiratory distress  She has no wheezes  She has no rales  Abdominal: Soft  Musculoskeletal: Normal range of motion  She exhibits no edema  Neurological: She is alert and oriented to person, place, and time  Skin: Skin is warm and dry

## 2019-03-13 NOTE — ASSESSMENT & PLAN NOTE
Ms Deepak Castro has severe COPD per her office spirometry today  Her FEV1 is 48% predicted and I encouraged her to use her daily maintenance therapy  Rather than the Breo I have prescribed Anoro 1 puff daily in addition to her rescue inhaler  I encouraged her to use IS every day he noted to gain better quality of life  She will maintain on oxygen as her pulse ox drops to 85% with ambulation on room air  She will maintain on 2 L of oxygen with exertion and with sleep  I encouraged her to go forward with her trip to [facility] but certainly needs to wear oxygen on the plane  She is up-to-date on her flu shot and pneumonia shot will follow up with her in 4 months

## 2019-06-28 ENCOUNTER — TELEPHONE (OUTPATIENT)
Dept: HEMATOLOGY ONCOLOGY | Facility: CLINIC | Age: 84
End: 2019-06-28

## 2019-09-23 ENCOUNTER — HOSPITAL ENCOUNTER (OUTPATIENT)
Dept: CT IMAGING | Facility: HOSPITAL | Age: 84
Discharge: HOME | End: 2019-09-23
Attending: INTERNAL MEDICINE
Payer: COMMERCIAL

## 2019-09-23 DIAGNOSIS — R97.0 ELEVATED CEA: ICD-10-CM

## 2019-09-23 DIAGNOSIS — C18.4 MALIGNANT NEOPLASM OF TRANSVERSE COLON (HCC): ICD-10-CM

## 2019-09-23 PROCEDURE — 74177 CT ABD & PELVIS W/CONTRAST: CPT

## 2019-09-23 RX ADMIN — IOHEXOL 80 ML: 350 INJECTION, SOLUTION INTRAVENOUS at 17:16

## 2019-12-09 ENCOUNTER — OFFICE VISIT (OUTPATIENT)
Dept: HEMATOLOGY ONCOLOGY | Facility: CLINIC | Age: 84
End: 2019-12-09
Payer: COMMERCIAL

## 2019-12-09 VITALS
HEART RATE: 84 BPM | DIASTOLIC BLOOD PRESSURE: 72 MMHG | BODY MASS INDEX: 21.62 KG/M2 | WEIGHT: 122 LBS | TEMPERATURE: 97.6 F | SYSTOLIC BLOOD PRESSURE: 122 MMHG | OXYGEN SATURATION: 100 % | RESPIRATION RATE: 14 BRPM | HEIGHT: 63 IN

## 2019-12-09 DIAGNOSIS — C18.4 MALIGNANT NEOPLASM OF TRANSVERSE COLON (HCC): Primary | ICD-10-CM

## 2019-12-09 PROCEDURE — 99214 OFFICE O/P EST MOD 30 MIN: CPT | Performed by: INTERNAL MEDICINE

## 2019-12-09 NOTE — PROGRESS NOTES
Hematology Outpatient Follow - Up Note  Beronica Campos 80 y o  female MRN: @ Encounter: [id number]        Date:  12/9/2019        Assessment/ Plan:    3 12-year-old  female who presented with obstruction mass in the mid ascending colon status post right hemicolectomy in August 2017, pathology showed adenocarcinoma the right colon, moderately differentiated with extension into the muscularis propria stage IIIB (T3, N1, M0) with 1/17 positive lymph nodes, no evidence of Saldaña syndrome, she decided not to take adjuvant chemotherapy    Most recent CT scan showed no evidence of disease    Continue follow-up in 6 months with CBC, CMP, CEA    2  COPD, on albuterol inhaler p r n  Followed by you            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 , she had abdominal pain in August 2017 requiring admission to the hospital, CT scan showed obstructing annular mass with the mid descending colon no evidence of distant metastasis,colonoscopy showed right-sided mucinous adenocarcinoma, status post right hemicolectomy on 08/18/2017 showed adenocarcinoma of the right colon, moderately differentiated, extends through muscularis propria, negative margins, with foci suspicious for perineural invasion, 1/17 positive lymph nodes, stage IIIB ( T3, N1, M0 ), no evidence of Saldaña syndrome       Interval History:        Previous Treatment:         Test Results:    Imaging: No results found      Labs:   Lab Results   Component Value Date    WBC 10 67 (H) 01/08/2019    HGB 14 5 01/08/2019    HCT 44 0 01/08/2019     (H) 01/08/2019     01/08/2019     Lab Results   Component Value Date    K 4 7 01/08/2019     01/08/2019    CO2 29 01/08/2019    BUN 23 01/08/2019    CREATININE 0 80 01/08/2019    CALCIUM 8 9 01/08/2019    AST 39 01/04/2019    ALT 18 01/04/2019    ALKPHOS 73 01/04/2019    EGFR 68 01/08/2019       No results found for: IRON, TIBC, FERRITIN    No results found for: Shu Daisy      ROS: Review of Systems   Constitutional: Negative for appetite change, chills, diaphoresis, fatigue and unexpected weight change  HENT:   Negative for mouth sores, nosebleeds, sore throat, trouble swallowing and voice change  Eyes: Negative for eye problems and icterus  Respiratory: Negative for chest tightness, cough, hemoptysis and wheezing  Cardiovascular: Negative for chest pain, leg swelling and palpitations  Gastrointestinal: Negative for abdominal distention, abdominal pain, blood in stool, constipation, diarrhea, nausea and vomiting  Endocrine: Negative for hot flashes  Genitourinary: Negative for bladder incontinence, difficulty urinating, dyspareunia, dysuria and frequency  Musculoskeletal: Negative for arthralgias, back pain, gait problem, neck pain and neck stiffness  Skin: Negative for itching and rash  Neurological: Negative for dizziness, gait problem, headaches, numbness, seizures and speech difficulty  Hematological: Negative for adenopathy  Does not bruise/bleed easily  Psychiatric/Behavioral: Negative for decreased concentration, depression, sleep disturbance and suicidal ideas  The patient is not nervous/anxious  Current Medications: Reviewed  Allergies: Reviewed  PMH/FH/SH:  Reviewed      Physical Exam:    Body surface area is 1 57 meters squared  Wt Readings from Last 3 Encounters:   12/09/19 55 3 kg (122 lb)   07/30/19 53 5 kg (118 lb)   06/25/19 53 5 kg (118 lb)        Temp Readings from Last 3 Encounters:   12/09/19 97 6 °F (36 4 °C)   03/13/19 97 6 °F (36 4 °C) (Tympanic)   01/10/19 98 1 °F (36 7 °C) (Oral)        BP Readings from Last 3 Encounters:   12/09/19 122/72   03/13/19 142/94   03/11/19 118/70         Pulse Readings from Last 3 Encounters:   12/09/19 84   03/13/19 91   03/11/19 78        Physical Exam   Constitutional: She is oriented to person, place, and time  She appears well-developed and well-nourished  No distress  HENT:   Head: Normocephalic and atraumatic  Eyes: Conjunctivae are normal    Neck: Normal range of motion  Neck supple  No tracheal deviation present  Cardiovascular: Normal rate and regular rhythm  Exam reveals no gallop and no friction rub  No murmur heard  Pulmonary/Chest: Effort normal and breath sounds normal  No respiratory distress  She has no wheezes  She has no rales  She exhibits no tenderness  Abdominal: Soft  She exhibits no distension  There is no tenderness  Lymphadenopathy:     She has no cervical adenopathy  Neurological: She is alert and oriented to person, place, and time  Skin: Skin is warm and dry  She is not diaphoretic  No erythema  No pallor  Psychiatric: She has a normal mood and affect  Her behavior is normal  Judgment and thought content normal    Vitals reviewed  ECO    Goals and Barriers:  Current Goal: Minimize effects of disease  Barriers: None  Patient's Capacity to Self Care:  Patient is able to self care      Code Status: [unfilled]

## 2020-07-17 ENCOUNTER — TELEPHONE (OUTPATIENT)
Dept: HEMATOLOGY ONCOLOGY | Facility: CLINIC | Age: 85
End: 2020-07-17

## 2020-07-17 NOTE — TELEPHONE ENCOUNTER
Sent out appt change form  usable to leave message her appt  Changed form Sept 21 to Oct 19 th  At 2:20

## 2020-10-16 ENCOUNTER — TELEPHONE (OUTPATIENT)
Dept: HEMATOLOGY ONCOLOGY | Facility: CLINIC | Age: 85
End: 2020-10-16

## 2020-10-16 ENCOUNTER — TELEPHONE (OUTPATIENT)
Dept: SURGICAL ONCOLOGY | Facility: CLINIC | Age: 85
End: 2020-10-16

## 2020-10-16 ENCOUNTER — TELEPHONE (OUTPATIENT)
Dept: HEMATOLOGY ONCOLOGY | Facility: MEDICAL CENTER | Age: 85
End: 2020-10-16

## 2020-10-19 ENCOUNTER — OFFICE VISIT (OUTPATIENT)
Dept: HEMATOLOGY ONCOLOGY | Facility: CLINIC | Age: 85
End: 2020-10-19
Payer: COMMERCIAL

## 2020-10-19 VITALS
RESPIRATION RATE: 16 BRPM | HEIGHT: 63 IN | TEMPERATURE: 97.9 F | HEART RATE: 103 BPM | WEIGHT: 119.5 LBS | DIASTOLIC BLOOD PRESSURE: 82 MMHG | BODY MASS INDEX: 21.17 KG/M2 | SYSTOLIC BLOOD PRESSURE: 120 MMHG | OXYGEN SATURATION: 91 %

## 2020-10-19 DIAGNOSIS — C18.4 MALIGNANT NEOPLASM OF TRANSVERSE COLON (HCC): Primary | ICD-10-CM

## 2020-10-19 PROCEDURE — 99214 OFFICE O/P EST MOD 30 MIN: CPT | Performed by: INTERNAL MEDICINE

## 2020-11-23 ENCOUNTER — TELEPHONE (OUTPATIENT)
Dept: HEMATOLOGY ONCOLOGY | Facility: CLINIC | Age: 85
End: 2020-11-23

## 2020-12-01 ENCOUNTER — TELEPHONE (OUTPATIENT)
Dept: HEMATOLOGY ONCOLOGY | Facility: CLINIC | Age: 85
End: 2020-12-01

## 2020-12-01 DIAGNOSIS — K57.92 DIVERTICULITIS: Primary | ICD-10-CM

## 2020-12-01 RX ORDER — CIPROFLOXACIN 250 MG/1
250 TABLET, FILM COATED ORAL EVERY 12 HOURS SCHEDULED
Qty: 20 TABLET | Refills: 0 | Status: SHIPPED | OUTPATIENT
Start: 2020-12-01 | End: 2020-12-11

## 2021-01-01 ENCOUNTER — TELEPHONE (OUTPATIENT)
Dept: PULMONOLOGY | Facility: CLINIC | Age: 86
End: 2021-01-01

## 2021-01-01 ENCOUNTER — IMMUNIZATIONS (OUTPATIENT)
Dept: FAMILY MEDICINE CLINIC | Facility: HOSPITAL | Age: 86
End: 2021-01-01

## 2021-01-01 ENCOUNTER — OFFICE VISIT (OUTPATIENT)
Dept: PULMONOLOGY | Facility: CLINIC | Age: 86
End: 2021-01-01
Payer: COMMERCIAL

## 2021-01-01 VITALS
BODY MASS INDEX: 19.35 KG/M2 | DIASTOLIC BLOOD PRESSURE: 76 MMHG | WEIGHT: 109.2 LBS | SYSTOLIC BLOOD PRESSURE: 116 MMHG | HEART RATE: 93 BPM | HEIGHT: 63 IN | TEMPERATURE: 96.5 F | OXYGEN SATURATION: 93 %

## 2021-01-01 DIAGNOSIS — J44.9 COPD, SEVERE (HCC): Primary | ICD-10-CM

## 2021-01-01 DIAGNOSIS — Z23 ENCOUNTER FOR IMMUNIZATION: Primary | ICD-10-CM

## 2021-01-01 PROCEDURE — 91300 SARS-COV-2 / COVID-19 MRNA VACCINE (PFIZER-BIONTECH) 30 MCG: CPT

## 2021-01-01 PROCEDURE — 1160F RVW MEDS BY RX/DR IN RCRD: CPT | Performed by: INTERNAL MEDICINE

## 2021-01-01 PROCEDURE — 99214 OFFICE O/P EST MOD 30 MIN: CPT | Performed by: INTERNAL MEDICINE

## 2021-01-01 PROCEDURE — 0002A SARS-COV-2 / COVID-19 MRNA VACCINE (PFIZER-BIONTECH) 30 MCG: CPT

## 2021-01-01 RX ORDER — IPRATROPIUM BROMIDE 42 UG/1
2 SPRAY, METERED NASAL 3 TIMES DAILY
Qty: 15 ML | Refills: 5 | Status: SHIPPED | OUTPATIENT
Start: 2021-01-01

## 2021-01-15 ENCOUNTER — TELEPHONE (OUTPATIENT)
Dept: PULMONOLOGY | Facility: CLINIC | Age: 86
End: 2021-01-15

## 2021-01-15 ENCOUNTER — TELEPHONE (OUTPATIENT)
Dept: SURGICAL ONCOLOGY | Facility: CLINIC | Age: 86
End: 2021-01-15

## 2021-01-15 NOTE — TELEPHONE ENCOUNTER
COVID Pre-Visit Screening     1  Is this a family member screening? {/NO  2  Have you traveled outside of your state in the past 2 weeks? /No  3  Do you presently have a fever or flu-like symptoms? NO:  4  Do you have symptoms of an upper respiratory infection like runny nose, sore throat, or cough? /NO:  5  Are you suffering from new headache that you have not had in the past?  /NO  6  Do you have/have you experienced any new shortness of breath recently? {/NO  7  Do you have any new diarrhea, nausea or vomiting? /NO:  8  Have you been in contact with anyone who has been sick or diagnosed with COVID-19? {/NO:  9  Do you have any new loss of taste or smell? /NO:  10   Are you able to wear a mask without a valve for the entire visit?  {YES

## 2021-01-18 ENCOUNTER — OFFICE VISIT (OUTPATIENT)
Dept: PULMONOLOGY | Facility: CLINIC | Age: 86
End: 2021-01-18
Payer: COMMERCIAL

## 2021-01-18 ENCOUNTER — OFFICE VISIT (OUTPATIENT)
Dept: HEMATOLOGY ONCOLOGY | Facility: CLINIC | Age: 86
End: 2021-01-18
Payer: COMMERCIAL

## 2021-01-18 VITALS
TEMPERATURE: 98.6 F | HEIGHT: 63 IN | WEIGHT: 116 LBS | SYSTOLIC BLOOD PRESSURE: 105 MMHG | RESPIRATION RATE: 17 BRPM | OXYGEN SATURATION: 82 % | BODY MASS INDEX: 20.55 KG/M2 | DIASTOLIC BLOOD PRESSURE: 60 MMHG | HEART RATE: 83 BPM

## 2021-01-18 VITALS
SYSTOLIC BLOOD PRESSURE: 116 MMHG | OXYGEN SATURATION: 100 % | HEART RATE: 83 BPM | WEIGHT: 116 LBS | TEMPERATURE: 97.9 F | HEIGHT: 63 IN | BODY MASS INDEX: 20.55 KG/M2 | RESPIRATION RATE: 16 BRPM | DIASTOLIC BLOOD PRESSURE: 70 MMHG

## 2021-01-18 DIAGNOSIS — C18.9 MALIGNANT NEOPLASM OF COLON, UNSPECIFIED PART OF COLON (HCC): Primary | ICD-10-CM

## 2021-01-18 DIAGNOSIS — C18.6 MALIGNANT NEOPLASM OF DESCENDING COLON (HCC): ICD-10-CM

## 2021-01-18 DIAGNOSIS — J44.9 COPD, SEVERE (HCC): Primary | ICD-10-CM

## 2021-01-18 PROCEDURE — 99214 OFFICE O/P EST MOD 30 MIN: CPT | Performed by: INTERNAL MEDICINE

## 2021-01-18 PROCEDURE — 99214 OFFICE O/P EST MOD 30 MIN: CPT | Performed by: PHYSICIAN ASSISTANT

## 2021-01-18 NOTE — PROGRESS NOTES
Assessment/Plan:    COPD, severe ([facility] )  Jeannine Mccauley is doing well despite not using any inhalers at all  I advised her to take the Anoro 1 puff daily as it will not cause thrush nor does it increase her likelihood to have pneumonia  She can also use her rescue inhaler as needed  We talked at length about her oxygen use and had a keep her oxygen levels above 90%  She only needs 2 L with exertion  I talked to her at length about the coronavirus vaccine as well I advised her to call the number and sign upon my chart  Diagnoses and all orders for this visit:    COPD, severe ([facility] [address] )    [facility]          Subjective:      Patient ID: Beronica Campos is a 80 y o  female  Jeannine Mccauley has been doing quite well  She is maintaining off any inhalers  She denies any cough or wheeze  She is using her oxygen intermittently  The following portions of the patient's history were reviewed and updated as appropriate: allergies, current medications, past family history, past medical history, past social history, past surgical history and problem list     Review of Systems   Constitutional: Negative  Negative for unexpected weight change  HENT: Negative  Negative for postnasal drip  Eyes: Negative  Respiratory: Negative  Negative for cough, shortness of breath and wheezing  Cardiovascular: Negative  Negative for chest pain and leg swelling  Gastrointestinal: Negative  Endocrine: Negative  Genitourinary: Negative  Musculoskeletal: Negative  Allergic/Immunologic: Negative  Neurological: Negative  Hematological: Negative  Objective:      /70 (BP Location: Left arm, Patient Position: Sitting, Cuff Size: Adult)   Pulse 83   Temp 97 9 °F (36 6 °C) (Tympanic)   Resp 16   Ht 5' 3" (1 6 m)   Wt 52 6 kg (116 lb)   SpO2 100%   BMI 20 55 kg/m²          Physical Exam  Constitutional:       Appearance: She is well-developed     HENT:      Head: Normocephalic  Eyes:      Pupils: Pupils are equal, round, and reactive to light  Neck:      Musculoskeletal: Normal range of motion and neck supple  Cardiovascular:      Rate and Rhythm: Normal rate  Heart sounds: No murmur  Pulmonary:      Effort: Pulmonary effort is normal  No respiratory distress  Breath sounds: Normal breath sounds  No wheezing or rales  Abdominal:      Palpations: Abdomen is soft  Musculoskeletal: Normal range of motion  Skin:     General: Skin is warm and dry  Neurological:      Mental Status: She is alert and oriented to person, place, and time

## 2021-01-18 NOTE — ASSESSMENT & PLAN NOTE
Forest Marie is doing well despite not using any inhalers at all  I advised her to take the Anoro 1 puff daily as it will not cause thrush nor does it increase her likelihood to have pneumonia  She can also use her rescue inhaler as needed  We talked at length about her oxygen use and had a keep her oxygen levels above 90%  She only needs 2 L with exertion  I talked to her at length about the coronavirus vaccine as well I advised her to call the number and sign upon my chart

## 2021-01-18 NOTE — PATIENT INSTRUCTIONS
[id number] for vaccine appointment    Start Anoro one puff daily  Use oxygen 2 to 3 liters with exertion

## 2021-01-22 DIAGNOSIS — Z23 ENCOUNTER FOR IMMUNIZATION: ICD-10-CM

## 2021-01-25 DIAGNOSIS — J44.9 COPD, SEVERE (HCC): ICD-10-CM

## 2021-02-23 ENCOUNTER — TELEPHONE (OUTPATIENT)
Dept: HEMATOLOGY ONCOLOGY | Facility: CLINIC | Age: 86
End: 2021-02-23

## 2021-02-23 NOTE — TELEPHONE ENCOUNTER
Appointment Cancellation Or Reschedule     Person calling in Patient    Provider Dr Kory Lozano   Office Visit Date and Time 03/01 at 3:40pm    Office Visit Location [facility]   Did patient reschedule their appointment? no   Is this patient a treatment patient? no   When is their next infusion visit? n/a   Reason for Cancellation or Reschedule Patient has back issues that she would like to attend to first, she will call back to shantanu      If the patient is a treatment patient, please route this to the office nurse  If the patient is not on treatment, please route to the office MA

## 2021-03-02 ENCOUNTER — IMMUNIZATIONS (OUTPATIENT)
Dept: FAMILY MEDICINE CLINIC | Facility: HOSPITAL | Age: 86
End: 2021-03-02

## 2021-03-02 DIAGNOSIS — Z23 ENCOUNTER FOR IMMUNIZATION: Primary | ICD-10-CM

## 2021-03-02 PROCEDURE — 0001A SARS-COV-2 / COVID-19 MRNA VACCINE (PFIZER-BIONTECH) 30 MCG: CPT

## 2021-03-02 PROCEDURE — 91300 SARS-COV-2 / COVID-19 MRNA VACCINE (PFIZER-BIONTECH) 30 MCG: CPT

## 2021-06-29 PROBLEM — J34.89 RHINORRHEA: Status: ACTIVE | Noted: 2021-01-01

## 2021-06-29 NOTE — ASSESSMENT & PLAN NOTE
1 [facility] rhinorrhea which will likely respond favorably ipratropium nasal spray  I have prescribed this for her and asked her to use it 3 times a day

## 2021-06-29 NOTE — ASSESSMENT & PLAN NOTE
Марина and I talked at length about her COPD  I explained that her lung function is quite impaired at 40% and her oxygen levels live on the steep slope of the oxygen dissociation curve  That being said I think she would do better with less chance of exacerbation, hospitalization and improved quality of life if she took her Anoro every day and use her oxygen with exertion  She is agreeable to do both of these things

## 2021-06-29 NOTE — PROGRESS NOTES
Assessment/Plan:    COPD, severe ([address] )    Марина and I talked at length about her COPD  I explained that her lung function is quite impaired at 40% and her oxygen levels live on the steep slope of the oxygen dissociation curve  That being said I think she would do better with less chance of exacerbation, hospitalization and improved quality of life if she took her Anoro every day and use her oxygen with exertion  She is agreeable to do both of these things  Rhinorrhea   Марина describes [place] rhinorrhea which will likely respond favorably ipratropium nasal spray  I have prescribed this for her and asked her to use it 3 times a day  Diagnoses and all orders for this visit:    COPD, severe ([address] )  -     ipratropium (ATROVENT) 0 06 % nasal spray; 2 sprays into each nostril 3 (three) times a day          Subjective:      Patient ID: Carrie Burleson is a 80 y o  female  Berntony Nathaner is doing well  She denies any issues except that she gets short of breath and hypoxic with exertion  She does not wear her oxygen with exertion  She knows some dry cough intermittently and significant rhinorrhea  primary symptoms  Pertinent negatives include no chest pain or coughing  The following portions of the patient's history were reviewed and updated as appropriate: allergies, current medications, past family history, past medical history, past social history, past surgical history and problem list     Review of Systems   Constitutional: Negative  Negative for unexpected weight change  HENT: Positive for postnasal drip, rhinorrhea and sneezing  Eyes: Negative  Respiratory: Negative  Negative for cough, shortness of breath and wheezing  Cardiovascular: Negative  Negative for chest pain and leg swelling  Gastrointestinal: Negative  Endocrine: Negative  Genitourinary: Negative  Musculoskeletal: Negative  Allergic/Immunologic: Negative  Neurological: Negative  Hematological: Negative  Objective:      /76 (BP Location: Left arm, Patient Position: Sitting)   Pulse 93   Temp (!) 96 5 °F (35 8 °C) (Temporal)   Ht 5' 3" (1 6 m)   Wt 49 5 kg (109 lb 3 2 oz)   SpO2 93%   BMI 19 34 kg/m²          Physical Exam  Constitutional:       Appearance: She is well-developed  HENT:      Head: Normocephalic  Eyes:      Pupils: Pupils are equal, round, and reactive to light  Cardiovascular:      Rate and Rhythm: Normal rate  Heart sounds: No murmur heard  Pulmonary:      Effort: Pulmonary effort is normal  No respiratory distress  Breath sounds: Normal breath sounds  No wheezing or rales  Abdominal:      Palpations: Abdomen is soft  Musculoskeletal:         General: Normal range of motion  Cervical back: Normal range of motion and neck supple  Skin:     General: Skin is warm and dry  Neurological:      Mental Status: She is alert and oriented to person, place, and time           Answers for HPI/ROS submitted by the patient on 6/23/2021  Do you have shortness of breath that occurs with effort or exertion?: Yes  Do you have nasal congestion?: Yes  Which of the following makes your symptoms worse?: climbing stairs, exercise, strenuous activity  Which of the following makes your symptoms better?: rest

## 2022-01-01 ENCOUNTER — TELEPHONE (OUTPATIENT)
Dept: HEMATOLOGY ONCOLOGY | Facility: CLINIC | Age: 87
End: 2022-01-01

## 2022-01-01 ENCOUNTER — TELEPHONE (OUTPATIENT)
Dept: OTHER | Facility: OTHER | Age: 87
End: 2022-01-01

## 2022-01-01 ENCOUNTER — APPOINTMENT (OUTPATIENT)
Dept: LAB | Facility: HOSPITAL | Age: 87
End: 2022-01-01
Attending: INTERNAL MEDICINE
Payer: COMMERCIAL

## 2022-01-01 ENCOUNTER — OFFICE VISIT (OUTPATIENT)
Dept: HEMATOLOGY ONCOLOGY | Facility: CLINIC | Age: 87
End: 2022-01-01
Payer: COMMERCIAL

## 2022-01-01 DIAGNOSIS — C18.9 MALIGNANT NEOPLASM OF COLON, UNSPECIFIED PART OF COLON (HCC): ICD-10-CM

## 2022-01-01 DIAGNOSIS — C18.9 MALIGNANT NEOPLASM OF COLON, UNSPECIFIED PART OF COLON (HCC): Primary | ICD-10-CM

## 2022-01-01 DIAGNOSIS — R17 JAUNDICE: ICD-10-CM

## 2022-01-01 DIAGNOSIS — C18.6 MALIGNANT NEOPLASM OF DESCENDING COLON (HCC): Primary | ICD-10-CM

## 2022-01-01 LAB — POTASSIUM SERPL-SCNC: 3.3 MMOL/L (ref 3.5–5.3)

## 2022-01-01 PROCEDURE — 84132 ASSAY OF SERUM POTASSIUM: CPT

## 2022-01-01 PROCEDURE — 99215 OFFICE O/P EST HI 40 MIN: CPT | Performed by: INTERNAL MEDICINE

## 2022-01-01 PROCEDURE — 36415 COLL VENOUS BLD VENIPUNCTURE: CPT

## 2022-01-31 NOTE — TELEPHONE ENCOUNTER
Patient calling to speak with nurse  She states when she spoke with her earlier, there was some information that she wanted to give her  She can be reached at [phone]

## 2022-01-31 NOTE — TELEPHONE ENCOUNTER
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

## 2022-01-31 NOTE — TELEPHONE ENCOUNTER
Patient called stating she feels she is starting with Jaundice  Patient reports her face, hands and chest are yellow  Also the whites of her eyes are starting to turn yellow   Patient is scheduled for 2-16-22  Patient is not experiencing any other symptoms  Please call patient at [phone]   Patient will only see Marilee Fry

## 2022-02-01 NOTE — TELEPHONE ENCOUNTER
Spoke with pt who was asking about the ultrasound  Explained it is up to her whether she have that test or not, but it would likely not change Dr Lamberto Jacinto recommendations  Offered to set it up before appt, or could wait until after she sees Dr Britton Sloan if she feels she wants to proceed with it  Pt will wait until she has follow up

## 2022-02-01 NOTE — TELEPHONE ENCOUNTER
Spoke to Jose Hoff and informed her that her lab orders have been faxed to [place] [id number] yesterday by RN Meme Parish expressed understanding

## 2022-02-01 NOTE — TELEPHONE ENCOUNTER
Patient is calling in requesting to have her labs emailed over to her at [email or web address]

## 2022-02-08 NOTE — TELEPHONE ENCOUNTER
Pt called and informed her of potassium 6 4  Suggested pt go for repeat blood work today  Script faxed to her [facility] lab

## 2022-02-08 NOTE — TELEPHONE ENCOUNTER
Lab Result: Potassium 6 4    Date/Time Drawn: 2/7/22 16:09   Ordering Provider: Dr Fay Minor Name: Sandra Dalal       The following critical/stat result was read back to the lab as stated above and [employer] to the on-call provider  Tiger connect message sent to the on call provider with critical lab information

## 2022-02-08 NOTE — TELEPHONE ENCOUNTER
Called pt and discussed plan  She will go for repeat potassium level tomorrow 2/9 around 7am and then see Dr Leopoldo Prose at [id number] in the office

## 2022-02-09 NOTE — LETTER
February 9, 2022     HUNTER Abel [address] [facility]    Patient: Eldon Jacob   YOB: 1935   Date of Visit: 2/9/2022       Dear Dr Talon Tellez: Thank you for referring Eldon Jacob to me for evaluation  Below are my notes for this consultation  If you have questions, please do not hesitate to call me  I look forward to following your patient along with you  Sincerely,        Geneva Young MD        CC: No Recipients  Geneva Young MD  2/9/2022  8:24 AM  Sign when Signing Visit  Hematology Outpatient Follow - Up Note  Eldon Jacob 80 y o  female MRN: @ Encounter: [id number]        Date:  2/9/2022        Assessment/ Plan:    40-year-old  female who presented with obstructive mass in the mid ascending colon status post right hemicolectomy in August 2017 stage IIIB ( T3, N1, M0 ) with 1/17 positive lymph nodes, no evidence of Saldaña syndrome, she declined adjuvant chemotherapy  Now with obstructive jaundice, bilirubin 26, elevated liver enzyme, CA in 200 range consistent with stage IV colon cancer     She has satisfied with doing nothing  After a long conversation with her and her daughter we explained the hospice process, she would like to stay home and die at home    Will contact Hospice  for referral, emotional support was provided        Labs and imaging studies are reviewed by ordering provider once results are available  If there are findings that need immediate attention, you will be contacted when results available  Discussing results and the implication on your healthcare is best discussed in person at your follow-up visit         HPI:    59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  experienced abdominal pain in August 2017 requiring admission to the hospital  CT scan showed obstructing annular mass with the mid descending colon,  no evidence of distant metastasis  Colonoscopy showed right-sided mucinous adenocarcinoma  She is status post right hemicolectomy on 08/18/2017 pathology consistent with adenocarcinoma of the right colon, moderately differentiated, extends through muscularis propria, negative margins, with foci suspicious for perineural invasion, 1/17 positive lymph nodes, stage IIIB ( T3, N1, M0 ), no evidence of Saldaña syndrome      She declined adjuvant chemotherapy      CEA 8/2018 4 6  CEA 3/2019 6 4      CT scan A/P 9/2019 showed no evidence of disease  CEA 10/2020 12 3  She declined any additional workup        CEA 1/2021:  23 8  Hemoglobin 15 3, MCV 98, white blood cell count 9 3 with normal differential, platelets 696  CMP normal        Patient on oxygen for severe COPD  She requested a meeting in February 2022 with bilirubin of 26, elevated liver enzyme, jaundice, weight loss  Interval History:        Previous Treatment:         Test Results:    Imaging: No results found  Labs:   Lab Results   Component Value Date    WBC 10 67 (H) 01/08/2019    HGB 14 5 01/08/2019    HCT 44 0 01/08/2019     (H) 01/08/2019     01/08/2019     Lab Results   Component Value Date    K 3 3 (L) 02/09/2022     01/08/2019    CO2 29 01/08/2019    BUN 23 01/08/2019    CREATININE 0 80 01/08/2019    CALCIUM 8 9 01/08/2019    AST 39 01/04/2019    ALT 18 01/04/2019    ALKPHOS 73 01/04/2019    EGFR 68 01/08/2019       No results found for: IRON, TIBC, FERRITIN    No results found for: [id number]      ROS: Review of Systems   Constitutional: Positive for appetite change and unexpected weight change  Negative for chills, diaphoresis and fatigue  HENT:   Negative for mouth sores, nosebleeds, sore throat, trouble swallowing and voice change  Eyes: Negative for eye problems and icterus  Respiratory: Positive for cough, shortness of breath and wheezing  Negative for chest tightness and hemoptysis      Cardiovascular: Negative for chest pain, leg swelling and palpitations  Gastrointestinal: Negative for abdominal distention, abdominal pain, blood in stool, constipation, diarrhea, nausea and vomiting  Endocrine: Negative for hot flashes  Genitourinary: Negative for bladder incontinence, difficulty urinating, dyspareunia, dysuria and frequency  Musculoskeletal: Negative for arthralgias, back pain, gait problem, neck pain and neck stiffness  Skin: Negative for itching and rash  Neurological: Negative for dizziness, gait problem, headaches, numbness, seizures and speech difficulty  Hematological: Negative for adenopathy  Does not bruise/bleed easily  Psychiatric/Behavioral: Negative for decreased concentration, depression, sleep disturbance and suicidal ideas  The patient is not nervous/anxious  Current Medications: Reviewed  Allergies: Reviewed  PMH/FH/SH:  Reviewed      Physical Exam:    There is no height or weight on file to calculate BSA  Wt Readings from Last 3 Encounters:   06/29/21 49 5 kg (109 lb 3 2 oz)   01/18/21 52 6 kg (116 lb)   01/18/21 52 6 kg (116 lb)        Temp Readings from Last 3 Encounters:   06/29/21 (!) 96 5 °F (35 8 °C) (Temporal)   01/18/21 97 9 °F (36 6 °C) (Tympanic)   01/18/21 98 6 °F (37 °C)        BP Readings from Last 3 Encounters:   06/29/21 116/76   01/18/21 116/70   01/18/21 105/60         Pulse Readings from Last 3 Encounters:   06/29/21 93   01/18/21 83   01/18/21 83        Physical Exam  Vitals reviewed  Constitutional:       General: She is not in acute distress  Appearance: She is well-developed and underweight  She is not diaphoretic  HENT:      Head: Normocephalic and atraumatic  Eyes:      General: Scleral icterus present  Conjunctiva/sclera: Conjunctivae normal    Neck:      Trachea: No tracheal deviation  Cardiovascular:      Rate and Rhythm: Normal rate and regular rhythm  Heart sounds: No murmur heard  No friction rub  No gallop      Pulmonary: Effort: Pulmonary effort is normal  No respiratory distress  Breath sounds: Normal breath sounds  No wheezing or rales  Chest:      Chest wall: No tenderness  Abdominal:      General: There is no distension  Palpations: Abdomen is soft  Tenderness: There is no abdominal tenderness  Musculoskeletal:      Cervical back: Normal range of motion and neck supple  Lymphadenopathy:      Cervical: No cervical adenopathy  Skin:     General: Skin is warm and dry  Coloration: Skin is not pale  Findings: No erythema  Neurological:      Mental Status: She is alert and oriented to person, place, and time  Psychiatric:         Behavior: Behavior normal          Thought Content: Thought content normal          Judgment: Judgment normal          ECO  Goals and Barriers:  Current Goal: Minimize effects of disease  Barriers: None  Patient's Capacity to Self Care:  Patient is able to self care      Code Status: [unfilled]

## 2022-02-09 NOTE — PROGRESS NOTES
Hematology Outpatient Follow - Up Note  Alexis Gomez 80 y o  female MRN: @ Encounter: [id number]        Date:  2/9/2022        Assessment/ Plan:    80-year-old  female who presented with obstructive mass in the mid ascending colon status post right hemicolectomy in August 2017 stage IIIB ( T3, N1, M0 ) with 1/17 positive lymph nodes, no evidence of Saldaña syndrome, she declined adjuvant chemotherapy  Now with obstructive jaundice, bilirubin 26, elevated liver enzyme, CA in 200 range consistent with stage IV colon cancer     She has satisfied with doing nothing  After a long conversation with her and her daughter we explained the hospice process, she would like to stay home and die at home    Will contact Hospice  for referral, emotional support was provided        Labs and imaging studies are reviewed by ordering provider once results are available  If there are findings that need immediate attention, you will be contacted when results available  Discussing results and the implication on your healthcare is best discussed in person at your follow-up visit  HPI:    80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  experienced abdominal pain in August 2017 requiring admission to the hospital  CT scan showed obstructing annular mass with the mid descending colon,  no evidence of distant metastasis  Colonoscopy showed right-sided mucinous adenocarcinoma  She is status post right hemicolectomy on 08/18/2017 pathology consistent with adenocarcinoma of the right colon, moderately differentiated, extends through muscularis propria, negative margins, with foci suspicious for perineural invasion, 1/17 positive lymph nodes, stage IIIB ( T3, N1, M0 ), no evidence of Saldaña syndrome      She declined adjuvant chemotherapy      CEA 8/2018 4 6  CEA 3/2019 6 4      CT scan A/P 9/2019 showed no evidence of disease  CEA 10/2020 12 3    She declined any additional workup        CEA 1/2021:  23 8  Hemoglobin 15 3, MCV 98, white blood cell count 9 3 with normal differential, platelets 613  CMP normal        Patient on oxygen for severe COPD  She requested a meeting in February 2022 with bilirubin of 26, elevated liver enzyme, jaundice, weight loss  Interval History:        Previous Treatment:         Test Results:    Imaging: No results found  Labs:   Lab Results   Component Value Date    WBC 10 67 (H) 01/08/2019    HGB 14 5 01/08/2019    HCT 44 0 01/08/2019     (H) 01/08/2019     01/08/2019     Lab Results   Component Value Date    K 3 3 (L) 02/09/2022     01/08/2019    CO2 29 01/08/2019    BUN 23 01/08/2019    CREATININE 0 80 01/08/2019    CALCIUM 8 9 01/08/2019    AST 39 01/04/2019    ALT 18 01/04/2019    ALKPHOS 73 01/04/2019    EGFR 68 01/08/2019       No results found for: IRON, TIBC, FERRITIN    No results found for: [id number]      ROS: Review of Systems   Constitutional: Positive for appetite change and unexpected weight change  Negative for chills, diaphoresis and fatigue  HENT:   Negative for mouth sores, nosebleeds, sore throat, trouble swallowing and voice change  Eyes: Negative for eye problems and icterus  Respiratory: Positive for cough, shortness of breath and wheezing  Negative for chest tightness and hemoptysis  Cardiovascular: Negative for chest pain, leg swelling and palpitations  Gastrointestinal: Negative for abdominal distention, abdominal pain, blood in stool, constipation, diarrhea, nausea and vomiting  Endocrine: Negative for hot flashes  Genitourinary: Negative for bladder incontinence, difficulty urinating, dyspareunia, dysuria and frequency  Musculoskeletal: Negative for arthralgias, back pain, gait problem, neck pain and neck stiffness  Skin: Negative for itching and rash  Neurological: Negative for dizziness, gait problem, headaches, numbness, seizures and speech difficulty  Hematological: Negative for adenopathy  Does not bruise/bleed easily  Psychiatric/Behavioral: Negative for decreased concentration, depression, sleep disturbance and suicidal ideas  The patient is not nervous/anxious  Current Medications: Reviewed  Allergies: Reviewed  PMH/FH/SH:  Reviewed      Physical Exam:    There is no height or weight on file to calculate BSA  Wt Readings from Last 3 Encounters:   06/29/21 49 5 kg (109 lb 3 2 oz)   01/18/21 52 6 kg (116 lb)   01/18/21 52 6 kg (116 lb)        Temp Readings from Last 3 Encounters:   06/29/21 (!) 96 5 °F (35 8 °C) (Temporal)   01/18/21 97 9 °F (36 6 °C) (Tympanic)   01/18/21 98 6 °F (37 °C)        BP Readings from Last 3 Encounters:   06/29/21 116/76   01/18/21 116/70   01/18/21 105/60         Pulse Readings from Last 3 Encounters:   06/29/21 93   01/18/21 83   01/18/21 83        Physical Exam  Vitals reviewed  Constitutional:       General: She is not in acute distress  Appearance: She is well-developed and underweight  She is not diaphoretic  HENT:      Head: Normocephalic and atraumatic  Eyes:      General: Scleral icterus present  Conjunctiva/sclera: Conjunctivae normal    Neck:      Trachea: No tracheal deviation  Cardiovascular:      Rate and Rhythm: Normal rate and regular rhythm  Heart sounds: No murmur heard  No friction rub  No gallop  Pulmonary:      Effort: Pulmonary effort is normal  No respiratory distress  Breath sounds: Normal breath sounds  No wheezing or rales  Chest:      Chest wall: No tenderness  Abdominal:      General: There is no distension  Palpations: Abdomen is soft  Tenderness: There is no abdominal tenderness  Musculoskeletal:      Cervical back: Normal range of motion and neck supple  Lymphadenopathy:      Cervical: No cervical adenopathy  Skin:     General: Skin is warm and dry  Coloration: Skin is not pale  Findings: No erythema     Neurological: Mental Status: She is alert and oriented to person, place, and time  Psychiatric:         Behavior: Behavior normal          Thought Content: Thought content normal          Judgment: Judgment normal          ECO  Goals and Barriers:  Current Goal: Minimize effects of disease  Barriers: None  Patient's Capacity to Self Care:  Patient is able to self care      Code Status: [unfilled]

## (undated) DEVICE — 3M™ DURAPORE™ SURGICAL TAPE 1538-3, 3 INCH X 10 YARD (7,5CM X 9,1M), 4 ROLLS/BOX: Brand: 3M™ DURAPORE™

## (undated) DEVICE — BULB SYRINGE,IRRIGATION WITH PROTECTIVE CAP: Brand: DOVER

## (undated) DEVICE — SUT VICRYL 0 UR-6 27 IN J603H

## (undated) DEVICE — SUT MONOCRYL 4-0 PS-2 18 IN Y496G

## (undated) DEVICE — GLOVE INDICATOR PI UNDERGLOVE SZ 8 BLUE

## (undated) DEVICE — 1820 FOAM BLOCK NEEDLE COUNTER: Brand: DEVON

## (undated) DEVICE — TUBING SUCTION 5MM X 12 FT

## (undated) DEVICE — IRRIG ENDO FLO TUBING

## (undated) DEVICE — POOLE SUCTION HANDLE: Brand: CARDINAL HEALTH

## (undated) DEVICE — SUT SILK 2-0 TIES 144 IN LA55G

## (undated) DEVICE — ANTI-FOG SOLUTION WITH FOAM PAD: Brand: DEVON

## (undated) DEVICE — PROXIMATE LINEAR CUTTER RELOAD, BLUE, 75MM: Brand: PROXIMATE

## (undated) DEVICE — CHLORAPREP HI-LITE 26ML ORANGE

## (undated) DEVICE — LIGHT HANDLE COVER SLEEVE DISP BLUE STELLAR

## (undated) DEVICE — 3000CC GUARDIAN II: Brand: GUARDIAN

## (undated) DEVICE — SUT VICRYL 0 REEL 54 IN J287G

## (undated) DEVICE — GRADUATE PLASTIC 1000 ML

## (undated) DEVICE — ADHESIVE SKN CLSR HISTOACRYL FLEX 0.5ML LF

## (undated) DEVICE — UNIVERSAL GYN LAPAROSCOPY,KIT: Brand: CARDINAL HEALTH

## (undated) DEVICE — GLOVE SRG BIOGEL 7.5

## (undated) DEVICE — INTENDED FOR TISSUE SEPARATION, AND OTHER PROCEDURES THAT REQUIRE A SHARP SURGICAL BLADE TO PUNCTURE OR CUT.: Brand: BARD-PARKER SAFETY BLADES SIZE 11, STERILE

## (undated) DEVICE — INSUFLATION TUBING INSUFLOW (LEXION)

## (undated) DEVICE — ENSEAL LAPAROSCOPIC TISSUE SEALER G2 CURVED JAW FOR USE WITH G2 GENERATOR 5MM DIAMETER 35CM SHAFT LENGTH: Brand: ENSEAL

## (undated) DEVICE — BUTTON SWITCH PENCIL HOLSTER: Brand: VALLEYLAB

## (undated) DEVICE — POSITIONER EGGCRATE

## (undated) DEVICE — TRAY FOLEY 16FR URIMETER SURESTEP

## (undated) DEVICE — TOWEL SET X-RAY

## (undated) DEVICE — SPONGE LAP 18 X 18 IN

## (undated) DEVICE — ACCESS PLATFORM FOR MINIMALLY INVASIVE SURGERY.: Brand: GELPORT® LAPAROSCOPIC  SYSTEM

## (undated) DEVICE — PROXIMATE RELOADABLE LINEAR CUTTER WITH SAFETY LOCK-OUT, 75MM: Brand: PROXIMATE

## (undated) DEVICE — MEDI-VAC YANK SUCT HNDL W/TPRD BULBOUS TIP: Brand: CARDINAL HEALTH

## (undated) DEVICE — SUT PDS II 1 CTX 36 IN Z371T